# Patient Record
Sex: FEMALE | Race: WHITE | NOT HISPANIC OR LATINO | URBAN - METROPOLITAN AREA
[De-identification: names, ages, dates, MRNs, and addresses within clinical notes are randomized per-mention and may not be internally consistent; named-entity substitution may affect disease eponyms.]

---

## 2018-08-04 ENCOUNTER — INPATIENT (INPATIENT)
Facility: HOSPITAL | Age: 48
LOS: 2 days | Discharge: HOME | End: 2018-08-06
Attending: FAMILY MEDICINE | Admitting: FAMILY MEDICINE
Payer: COMMERCIAL

## 2018-08-04 VITALS
HEART RATE: 134 BPM | RESPIRATION RATE: 18 BRPM | TEMPERATURE: 98 F | SYSTOLIC BLOOD PRESSURE: 130 MMHG | DIASTOLIC BLOOD PRESSURE: 82 MMHG | OXYGEN SATURATION: 100 % | HEIGHT: 65 IN | WEIGHT: 274.92 LBS

## 2018-08-04 LAB
ALBUMIN SERPL ELPH-MCNC: 4 G/DL — SIGNIFICANT CHANGE UP (ref 3.3–5)
ALP SERPL-CCNC: 102 U/L — SIGNIFICANT CHANGE UP (ref 40–120)
ALT FLD-CCNC: 34 U/L — SIGNIFICANT CHANGE UP (ref 12–78)
ANION GAP SERPL CALC-SCNC: 14 MMOL/L — SIGNIFICANT CHANGE UP (ref 5–17)
ANION GAP SERPL CALC-SCNC: 8 MMOL/L — SIGNIFICANT CHANGE UP (ref 5–17)
APTT BLD: 32.4 SEC — SIGNIFICANT CHANGE UP (ref 27.5–37.4)
AST SERPL-CCNC: 37 U/L — SIGNIFICANT CHANGE UP (ref 15–37)
BASOPHILS # BLD AUTO: 0.03 K/UL — SIGNIFICANT CHANGE UP (ref 0–0.2)
BASOPHILS # BLD AUTO: 0.07 K/UL — SIGNIFICANT CHANGE UP (ref 0–0.2)
BASOPHILS NFR BLD AUTO: 0.2 % — SIGNIFICANT CHANGE UP (ref 0–2)
BASOPHILS NFR BLD AUTO: 0.5 % — SIGNIFICANT CHANGE UP (ref 0–2)
BILIRUB SERPL-MCNC: 0.3 MG/DL — SIGNIFICANT CHANGE UP (ref 0.2–1.2)
BUN SERPL-MCNC: 11 MG/DL — SIGNIFICANT CHANGE UP (ref 7–23)
BUN SERPL-MCNC: 14 MG/DL — SIGNIFICANT CHANGE UP (ref 7–23)
CALCIUM SERPL-MCNC: 9.3 MG/DL — SIGNIFICANT CHANGE UP (ref 8.5–10.1)
CALCIUM SERPL-MCNC: 9.5 MG/DL — SIGNIFICANT CHANGE UP (ref 8.5–10.1)
CHLORIDE SERPL-SCNC: 100 MMOL/L — SIGNIFICANT CHANGE UP (ref 96–108)
CHLORIDE SERPL-SCNC: 105 MMOL/L — SIGNIFICANT CHANGE UP (ref 96–108)
CO2 SERPL-SCNC: 23 MMOL/L — SIGNIFICANT CHANGE UP (ref 22–31)
CO2 SERPL-SCNC: 25 MMOL/L — SIGNIFICANT CHANGE UP (ref 22–31)
CREAT SERPL-MCNC: 0.78 MG/DL — SIGNIFICANT CHANGE UP (ref 0.5–1.3)
CREAT SERPL-MCNC: 1.29 MG/DL — SIGNIFICANT CHANGE UP (ref 0.5–1.3)
EOSINOPHIL # BLD AUTO: 0.04 K/UL — SIGNIFICANT CHANGE UP (ref 0–0.5)
EOSINOPHIL # BLD AUTO: 0.17 K/UL — SIGNIFICANT CHANGE UP (ref 0–0.5)
EOSINOPHIL NFR BLD AUTO: 0.3 % — SIGNIFICANT CHANGE UP (ref 0–6)
EOSINOPHIL NFR BLD AUTO: 1.2 % — SIGNIFICANT CHANGE UP (ref 0–6)
GLUCOSE SERPL-MCNC: 100 MG/DL — HIGH (ref 70–99)
GLUCOSE SERPL-MCNC: 125 MG/DL — HIGH (ref 70–99)
HCT VFR BLD CALC: 40.7 % — SIGNIFICANT CHANGE UP (ref 34.5–45)
HCT VFR BLD CALC: 42.3 % — SIGNIFICANT CHANGE UP (ref 34.5–45)
HGB BLD-MCNC: 13.2 G/DL — SIGNIFICANT CHANGE UP (ref 11.5–15.5)
HGB BLD-MCNC: 13.8 G/DL — SIGNIFICANT CHANGE UP (ref 11.5–15.5)
IMM GRANULOCYTES NFR BLD AUTO: 0.7 % — SIGNIFICANT CHANGE UP (ref 0–1.5)
IMM GRANULOCYTES NFR BLD AUTO: 0.9 % — SIGNIFICANT CHANGE UP (ref 0–1.5)
INR BLD: 1.01 RATIO — SIGNIFICANT CHANGE UP (ref 0.88–1.16)
LYMPHOCYTES # BLD AUTO: 17.4 % — SIGNIFICANT CHANGE UP (ref 13–44)
LYMPHOCYTES # BLD AUTO: 2.53 K/UL — SIGNIFICANT CHANGE UP (ref 1–3.3)
LYMPHOCYTES # BLD AUTO: 2.62 K/UL — SIGNIFICANT CHANGE UP (ref 1–3.3)
LYMPHOCYTES # BLD AUTO: 21.7 % — SIGNIFICANT CHANGE UP (ref 13–44)
MAGNESIUM SERPL-MCNC: 2.1 MG/DL — SIGNIFICANT CHANGE UP (ref 1.6–2.6)
MCHC RBC-ENTMCNC: 26.5 PG — LOW (ref 27–34)
MCHC RBC-ENTMCNC: 26.6 PG — LOW (ref 27–34)
MCHC RBC-ENTMCNC: 32.4 GM/DL — SIGNIFICANT CHANGE UP (ref 32–36)
MCHC RBC-ENTMCNC: 32.6 GM/DL — SIGNIFICANT CHANGE UP (ref 32–36)
MCV RBC AUTO: 81.6 FL — SIGNIFICANT CHANGE UP (ref 80–100)
MCV RBC AUTO: 81.7 FL — SIGNIFICANT CHANGE UP (ref 80–100)
MONOCYTES # BLD AUTO: 0.64 K/UL — SIGNIFICANT CHANGE UP (ref 0–0.9)
MONOCYTES # BLD AUTO: 0.74 K/UL — SIGNIFICANT CHANGE UP (ref 0–0.9)
MONOCYTES NFR BLD AUTO: 5.1 % — SIGNIFICANT CHANGE UP (ref 2–14)
MONOCYTES NFR BLD AUTO: 5.3 % — SIGNIFICANT CHANGE UP (ref 2–14)
NEUTROPHILS # BLD AUTO: 10.88 K/UL — HIGH (ref 1.8–7.4)
NEUTROPHILS # BLD AUTO: 8.66 K/UL — HIGH (ref 1.8–7.4)
NEUTROPHILS NFR BLD AUTO: 71.8 % — SIGNIFICANT CHANGE UP (ref 43–77)
NEUTROPHILS NFR BLD AUTO: 74.9 % — SIGNIFICANT CHANGE UP (ref 43–77)
NRBC # BLD: 0 /100 WBCS — SIGNIFICANT CHANGE UP (ref 0–0)
NRBC # BLD: 0 /100 WBCS — SIGNIFICANT CHANGE UP (ref 0–0)
NT-PROBNP SERPL-SCNC: 435 PG/ML — HIGH (ref 0–125)
PLATELET # BLD AUTO: 399 K/UL — SIGNIFICANT CHANGE UP (ref 150–400)
PLATELET # BLD AUTO: 473 K/UL — HIGH (ref 150–400)
POTASSIUM SERPL-MCNC: 3.1 MMOL/L — LOW (ref 3.5–5.3)
POTASSIUM SERPL-MCNC: 3.3 MMOL/L — LOW (ref 3.5–5.3)
POTASSIUM SERPL-MCNC: 4.2 MMOL/L — SIGNIFICANT CHANGE UP (ref 3.5–5.3)
POTASSIUM SERPL-SCNC: 3.1 MMOL/L — LOW (ref 3.5–5.3)
POTASSIUM SERPL-SCNC: 3.3 MMOL/L — LOW (ref 3.5–5.3)
POTASSIUM SERPL-SCNC: 4.2 MMOL/L — SIGNIFICANT CHANGE UP (ref 3.5–5.3)
PROT SERPL-MCNC: 8.1 GM/DL — SIGNIFICANT CHANGE UP (ref 6–8.3)
PROTHROM AB SERPL-ACNC: 10.9 SEC — SIGNIFICANT CHANGE UP (ref 9.8–12.7)
RBC # BLD: 4.99 M/UL — SIGNIFICANT CHANGE UP (ref 3.8–5.2)
RBC # BLD: 5.18 M/UL — SIGNIFICANT CHANGE UP (ref 3.8–5.2)
RBC # FLD: 15.3 % — HIGH (ref 10.3–14.5)
RBC # FLD: 15.7 % — HIGH (ref 10.3–14.5)
SODIUM SERPL-SCNC: 137 MMOL/L — SIGNIFICANT CHANGE UP (ref 135–145)
SODIUM SERPL-SCNC: 138 MMOL/L — SIGNIFICANT CHANGE UP (ref 135–145)
TROPONIN I SERPL-MCNC: 0.4 NG/ML — HIGH (ref 0.01–0.04)
TROPONIN I SERPL-MCNC: 1.26 NG/ML — HIGH (ref 0.01–0.04)
TROPONIN I SERPL-MCNC: 1.85 NG/ML — HIGH (ref 0.01–0.04)
TROPONIN I SERPL-MCNC: 1.95 NG/ML — HIGH (ref 0.01–0.04)
WBC # BLD: 12.08 K/UL — HIGH (ref 3.8–10.5)
WBC # BLD: 14.52 K/UL — HIGH (ref 3.8–10.5)
WBC # FLD AUTO: 12.08 K/UL — HIGH (ref 3.8–10.5)
WBC # FLD AUTO: 14.52 K/UL — HIGH (ref 3.8–10.5)

## 2018-08-04 PROCEDURE — 93010 ELECTROCARDIOGRAM REPORT: CPT | Mod: 76

## 2018-08-04 PROCEDURE — 71045 X-RAY EXAM CHEST 1 VIEW: CPT | Mod: 26

## 2018-08-04 PROCEDURE — 93306 TTE W/DOPPLER COMPLETE: CPT | Mod: 26

## 2018-08-04 PROCEDURE — 99285 EMERGENCY DEPT VISIT HI MDM: CPT

## 2018-08-04 PROCEDURE — 70450 CT HEAD/BRAIN W/O DYE: CPT | Mod: 26

## 2018-08-04 RX ORDER — ACETAMINOPHEN 500 MG
650 TABLET ORAL EVERY 8 HOURS
Qty: 0 | Refills: 0 | Status: DISCONTINUED | OUTPATIENT
Start: 2018-08-04 | End: 2018-08-06

## 2018-08-04 RX ORDER — POTASSIUM CHLORIDE 20 MEQ
40 PACKET (EA) ORAL ONCE
Qty: 0 | Refills: 0 | Status: COMPLETED | OUTPATIENT
Start: 2018-08-04 | End: 2018-08-04

## 2018-08-04 RX ORDER — ASPIRIN/CALCIUM CARB/MAGNESIUM 324 MG
325 TABLET ORAL ONCE
Qty: 0 | Refills: 0 | Status: COMPLETED | OUTPATIENT
Start: 2018-08-04 | End: 2018-08-04

## 2018-08-04 RX ORDER — ASPIRIN/CALCIUM CARB/MAGNESIUM 324 MG
81 TABLET ORAL DAILY
Qty: 0 | Refills: 0 | Status: DISCONTINUED | OUTPATIENT
Start: 2018-08-04 | End: 2018-08-04

## 2018-08-04 RX ORDER — METOPROLOL TARTRATE 50 MG
25 TABLET ORAL
Qty: 0 | Refills: 0 | Status: DISCONTINUED | OUTPATIENT
Start: 2018-08-04 | End: 2018-08-04

## 2018-08-04 RX ORDER — SODIUM CHLORIDE 9 MG/ML
2000 INJECTION INTRAMUSCULAR; INTRAVENOUS; SUBCUTANEOUS ONCE
Qty: 0 | Refills: 0 | Status: COMPLETED | OUTPATIENT
Start: 2018-08-04 | End: 2018-08-04

## 2018-08-04 RX ORDER — METOPROLOL TARTRATE 50 MG
50 TABLET ORAL
Qty: 0 | Refills: 0 | Status: DISCONTINUED | OUTPATIENT
Start: 2018-08-04 | End: 2018-08-05

## 2018-08-04 RX ORDER — SODIUM CHLORIDE 9 MG/ML
3 INJECTION INTRAMUSCULAR; INTRAVENOUS; SUBCUTANEOUS ONCE
Qty: 0 | Refills: 0 | Status: COMPLETED | OUTPATIENT
Start: 2018-08-04 | End: 2018-08-04

## 2018-08-04 RX ORDER — METOPROLOL TARTRATE 50 MG
25 TABLET ORAL ONCE
Qty: 0 | Refills: 0 | Status: COMPLETED | OUTPATIENT
Start: 2018-08-04 | End: 2018-08-04

## 2018-08-04 RX ORDER — HEPARIN SODIUM 5000 [USP'U]/ML
5000 INJECTION INTRAVENOUS; SUBCUTANEOUS EVERY 12 HOURS
Qty: 0 | Refills: 0 | Status: DISCONTINUED | OUTPATIENT
Start: 2018-08-04 | End: 2018-08-04

## 2018-08-04 RX ORDER — HEPARIN SODIUM 5000 [USP'U]/ML
INJECTION INTRAVENOUS; SUBCUTANEOUS
Qty: 25000 | Refills: 0 | Status: DISCONTINUED | OUTPATIENT
Start: 2018-08-04 | End: 2018-08-06

## 2018-08-04 RX ADMIN — Medication 25 MILLIGRAM(S): at 20:25

## 2018-08-04 RX ADMIN — HEPARIN SODIUM 1000 UNIT(S)/HR: 5000 INJECTION INTRAVENOUS; SUBCUTANEOUS at 20:22

## 2018-08-04 RX ADMIN — SODIUM CHLORIDE 2000 MILLILITER(S): 9 INJECTION INTRAMUSCULAR; INTRAVENOUS; SUBCUTANEOUS at 01:30

## 2018-08-04 RX ADMIN — Medication 25 MILLIGRAM(S): at 07:04

## 2018-08-04 RX ADMIN — HEPARIN SODIUM 5000 UNIT(S): 5000 INJECTION INTRAVENOUS; SUBCUTANEOUS at 17:49

## 2018-08-04 RX ADMIN — SODIUM CHLORIDE 2000 MILLILITER(S): 9 INJECTION INTRAMUSCULAR; INTRAVENOUS; SUBCUTANEOUS at 00:32

## 2018-08-04 RX ADMIN — Medication 25 MILLIGRAM(S): at 17:50

## 2018-08-04 RX ADMIN — SODIUM CHLORIDE 3 MILLILITER(S): 9 INJECTION INTRAMUSCULAR; INTRAVENOUS; SUBCUTANEOUS at 00:44

## 2018-08-04 RX ADMIN — Medication 325 MILLIGRAM(S): at 01:39

## 2018-08-04 RX ADMIN — Medication 40 MILLIEQUIVALENT(S): at 03:47

## 2018-08-04 RX ADMIN — Medication 40 MILLIEQUIVALENT(S): at 22:28

## 2018-08-04 NOTE — ED ADULT TRIAGE NOTE - CHIEF COMPLAINT QUOTE
Patient reports that she was at a bar tonight and after drinking and getting into a verbal argument began to have palpitations. With EMS went into Afib with RVR, Vtach with a pulse, and then broke back to Afib. No history of Afib. Given 20 mg IV of Cardizem. Current in AFib with no complaints.

## 2018-08-04 NOTE — CONSULT NOTE ADULT - SUBJECTIVE AND OBJECTIVE BOX
CHIEF COMPLAINT:    HPI:  Pt is a 48 year old female with PMh of anxiety/depression, lupus HTN who comes to the Ed BIBEMS s/p syncope today in the morning after feeling palpitations. Pt states that she went to a bar and then drank beer and then started to have profuse sweats and nausea and then was pale and 911 was called. EMS found pt in afib then went to [facility] and patient given cardizem. Pt then brought to the Ed. No complaints of pain just dizziness. No extremity weakness.     Pt in sinus rhythm at present, although EKG in ER did show AF. No acute changes on EKG. 3rd troponin 1.95. Her father has had about 10 stents. Chest pain and syncope.     PMHx:  HTN  Lupus    PSHx:  No recent surgeries    Family Hx:  Father: h/o CAD, HTN  Mother: h/o HTN, No CAD (04 Aug 2018 04:42)      PAST MEDICAL & SURGICAL HISTORY:  No pertinent past medical history  No significant past surgical history      Allergies    No Known Allergies    Intolerances        Occupation:  Alochol: Denied  Smoking: Denied  Drug Use: Denied  Marital Status:         FAMILY HISTORY:      REVIEW OF SYSTEMS:    CONSTITUTIONAL: No weakness, fevers or chills  EYES/ENT: No visual changes;  No vertigo or throat pain   NECK: No pain or stiffness  RESPIRATORY: No cough, wheezing, hemoptysis; No shortness of breath  CARDIOVASCULAR: No chest pain or palpitations  GASTROINTESTINAL: No abdominal or epigastric pain. No nausea, vomiting, or hematemesis; No diarrhea or constipation. No melena or hematochezia.  GENITOURINARY: No dysuria, frequency or hematuria  NEUROLOGICAL: No numbness or weakness  SKIN: No itching, burning, rashes, or lesions   All other review of systems is negative unless indicated above    Vital Signs Last 24 Hrs  T(C): 36.7 (04 Aug 2018 08:13), Max: 36.9 (04 Aug 2018 03:50)  T(F): 98.1 (04 Aug 2018 08:13), Max: 98.4 (04 Aug 2018 03:50)  HR: 88 (04 Aug 2018 08:45) (73 - 134)  BP: 144/85 (04 Aug 2018 08:45) (124/80 - 153/73)  BP(mean): 62 (04 Aug 2018 03:50) (62 - 62)  RR: 24 (04 Aug 2018 08:45) (15 - 24)  SpO2: 97% (04 Aug 2018 08:45) (97% - 100%)    I&O's Summary      PHYSICAL EXAM:    Constitutional: NAD, awake and alert, well-developed  HEENT: PERR, EOMI,  No oral cyananosis.  Neck:  supple,  No JVD  Respiratory: Breath sounds are clear bilaterally, No wheezing, rales or rhonchi  Cardiovascular: S1 and S2, regular rate and rhythm, no Murmurs, gallops or rubs  Gastrointestinal: Bowel Sounds present, soft, nontender.   Extremities: No peripheral edema. No clubbing or cyanosis.  Vascular: 2+ peripheral pulses  Neurological: A/O x 3, no focal deficits  Musculoskeletal: no calf tenderness.  Skin: No rashes.    MEDICATIONS:  MEDICATIONS  (STANDING):  metoprolol tartrate 25 milliGRAM(s) Oral two times a day      LABS: All Labs Reviewed:                        13.2   12.08 )-----------( 399      ( 04 Aug 2018 05:39 )             40.7                         13.8   14.52 )-----------( 473      ( 04 Aug 2018 00:17 )             42.3     04 Aug 2018 05:39    138    |  105    |  11     ----------------------------<  100    4.2     |  25     |  0.78   04 Aug 2018 00:17    137    |  100    |  14     ----------------------------<  125    3.1     |  23     |  1.29     Ca    9.3        04 Aug 2018 05:39  Ca    9.5        04 Aug 2018 00:17    TPro  8.1    /  Alb  4.0    /  TBili  0.3    /  DBili  x      /  AST  37     /  ALT  34     /  AlkPhos  102    04 Aug 2018 00:17    PT/INR - ( 04 Aug 2018 00:17 )   PT: 10.9 sec;   INR: 1.01 ratio         PTT - ( 04 Aug 2018 00:17 )  PTT:32.4 sec  CARDIAC MARKERS ( 04 Aug 2018 05:39 )  1.950 ng/mL / x     / x     / x     / x      CARDIAC MARKERS ( 04 Aug 2018 03:10 )  1.260 ng/mL / x     / x     / x     / x      CARDIAC MARKERS ( 04 Aug 2018 00:17 )  0.400 ng/mL / x     / x     / x     / x          Blood Culture:   08-04 @ 00:17  Pro Bnp 435        RADIOLOGY/EKG:

## 2018-08-04 NOTE — CONSULT NOTE ADULT - ASSESSMENT
1 yo female presented with Syncope and A fib.    A/P:    1.  Syncope  -will follow clinically in Monitored bed  -follow clinically  -will follow CT head   -follow echo and carotid duplex report   -echo showed normal LVEF with trace MR and TR    2.  A fib with RVR  Elevated troponin, needs cath on Monday. I spoke to pt and familly. I spoke to Dr. Weir who will do cath on Monday  HTN  -would not start IV Heparin  -NSR now  -will keep on BB for now  -trend troponin  -follow cardiology consult  -CHADVasc score: 2(HTN and female)  -will need chronic anticoagulation  -continue ASA  -follow BP and adjust meds as needed    3.  SCD for LE ppx   can use Heparin or Lovenox SQ

## 2018-08-04 NOTE — ED ADULT NURSE NOTE - OBJECTIVE STATEMENT
47yo female BIBA, as per EMS :shes in afib and went into V Tach" pt indicates she was drinking at a bar when she suddenly felt chest pain and syncope. pt denies neck/back pain,  pt denies cardiac history. EMS admin 20 MG Cardizem IV push. On arrival ot is currently rapid Afib.

## 2018-08-04 NOTE — H&P ADULT - NSHPPHYSICALEXAM_GEN_ALL_CORE
Vital Signs Last 24 Hrs  T(C): 36.9 (04 Aug 2018 03:50), Max: 36.9 (04 Aug 2018 03:50)  T(F): 98.4 (04 Aug 2018 03:50), Max: 98.4 (04 Aug 2018 03:50)  HR: 79 (04 Aug 2018 03:50) (79 - 134)  BP: 138/60 (04 Aug 2018 03:50) (124/80 - 138/60)  RR: 16 (04 Aug 2018 03:50) (16 - 18)  SpO2: 99% (04 Aug 2018 03:50) (98% - 100%)

## 2018-08-04 NOTE — H&P ADULT - NEUROLOGICAL DETAILS
deep reflexes intact/sensation intact/cranial nerves intact/alert and oriented x 3/responds to pain/responds to verbal commands/normal strength

## 2018-08-04 NOTE — CHART NOTE - NSCHARTNOTEFT_GEN_A_CORE
Patient is a 48y old  Female who presents with a chief complaint of complain of palpitation, syncope (04 Aug 2018 04:42)        HPI:  Pt is a 48 year old female with PMh of anxiety/depression, lupus HTN who comes to the Ed BIBEMS s/p syncope today in the morning after feeling palpitations. Pt states that she went to a bar and then drank beer and then started to have profuse sweats and nausea and then was pale and 911 was called. EMS found pt in afib then went to [facility] and patient given cardizem. Pt then brought to the Ed. No complaints of pain just dizziness. No extremity weakness.     PMHx:  HTN  Lupus    PSHx:  No recent surgeries    Family Hx:  Father: h/o CAD, HTN  Mother: h/o HTN, No CAD (04 Aug 2018 04:42)      SUBJECTIVE & OBJECTIVE: Pt seen and examined at bedside. She is in NSR.  States that she is feeling tired, denies shortness of breath, headache, or other symptom.     PHYSICAL EXAM:  T(C): 36.7 (08-04-18 @ 08:13), Max: 36.9 (08-04-18 @ 03:50)  HR: 88 (08-04-18 @ 08:45) (73 - 134)  BP: 144/85 (08-04-18 @ 08:45) (124/80 - 153/73)  RR: 24 (08-04-18 @ 08:45) (15 - 24)  SpO2: 97% (08-04-18 @ 08:45) (97% - 100%)  Wt(kg): -- Height (cm): 165.1 (08-04 @ 00:01)  Weight (kg): 129.9 (08-04 @ 04:42)  BMI (kg/m2): 47.7 (08-04 @ 04:42)  BSA (m2): 2.3 (08-04 @ 04:42)  I&O's Detail    GENERAL: NAD, well-groomed, well-developed, obese  HEAD:  Atraumatic, Normocephalic  EYES: EOMI, PERRLA, conjunctiva and sclera clear  ENMT: Moist mucous membranes  NECK: Supple, No JVD  NERVOUS SYSTEM:  Alert & Oriented X3, Motor Strength 5/5 B/L upper and lower extremities; DTRs 2+ intact and symmetric  CHEST/LUNG: Clear to auscultation bilaterally; No rales, rhonchi, wheezing, or rubs  HEART: Regular rate and rhythm; No murmurs, rubs, or gallops  ABDOMEN: Soft, Nontender, Nondistended; Bowel sounds present  EXTREMITIES:  2+ Peripheral Pulses, No clubbing, cyanosis, or edema    MEDICATIONS  (STANDING):  heparin  Injectable 5000 Unit(s) SubCutaneous every 12 hours  metoprolol tartrate 25 milliGRAM(s) Oral two times a day    MEDICATIONS  (PRN):  acetaminophen   Tablet 650 milliGRAM(s) Oral every 8 hours PRN For Temp greater than 38 C (100.4 F)  acetaminophen   Tablet. 650 milliGRAM(s) Oral every 8 hours PRN Mild Pain (1 - 3)      LABS:                        13.2   12.08 )-----------( 399      ( 04 Aug 2018 05:39 )             40.7     08-04    138  |  105  |  11  ----------------------------<  100<H>  4.2   |  25  |  0.78    Ca    9.3      04 Aug 2018 05:39    TPro  8.1  /  Alb  4.0  /  TBili  0.3  /  DBili  x   /  AST  37  /  ALT  34  /  AlkPhos  102  08-04  PT/INR - ( 04 Aug 2018 00:17 )   PT: 10.9 sec;   INR: 1.01 ratio    PTT - ( 04 Aug 2018 00:17 )  PTT:32.4 sec    CARDIAC MARKERS ( 04 Aug 2018 05:39 )  1.950 ng/mL / x     / x     / x     / x      CARDIAC MARKERS ( 04 Aug 2018 03:10 )  1.260 ng/mL / x     / x     / x     / x      CARDIAC MARKERS ( 04 Aug 2018 00:17 )  0.400 ng/mL / x     / x     / x     / x          DVT/GI ppx      49 y/o obese female that is admitted with PAF  1.  Syncope  -will follow CT head   -follow echo and carotid duplex report     2.  A fib with RVR  Elevated troponin  HTN  -NSR now, s/p IV Cardizem   -will keep on BB for now  -trend troponin  -Cardiology consult appreciated  -CHADVasc score: 2(HTN and female)  -will need chronic anticoagulation  -follow BP and adjust meds as needed  - for cardiac catherization on Monday     3.  VTE ppx with Heparin

## 2018-08-04 NOTE — H&P ADULT - HISTORY OF PRESENT ILLNESS
Pt is a 48 year old female with PMh of anxiety/depression, lupus HTN who comes to the Ed BIBEMS s/p syncope today in the morning after feeling palpitations. Pt states that she went to a bar and then drank beer and then started to have profuse sweats and nausea and then was pale and 911 was called. EMS found pt in afib then went to [facility] and patient given cardizem. Pt then brought to the Ed. No complaints of pain just dizziness. No extremity weakness.     PMHx:  HTN  Lupus    PSHx:  No recent surgeries    Family Hx:  Father: h/o CAD, HTN  Mother: h/o HTN, No CAD

## 2018-08-04 NOTE — H&P ADULT - ASSESSMENT
47 yo female presented with Syncope and A fib.    A/P:    1.  Syncope  -will follow clinically in Monitored bed  -follow clinically  -will follow CT head   -follow echo and carotid duplex report     2.  A fib with RVR  Elevated troponin  HTN  -NSR now  -will keep on BB for now  -trend troponin  -follow cardiology consult  -CHADVasc score: 2(HTN and female)  -will need chronic anticoagulation  -will start Lovenox after Head CT ie neg for any acute brain issue   -follow BP and adjust meds as needed    3.  SCD for LE ppx 49 yo female presented with Syncope and A fib.    A/P:    1.  Syncope  -will follow clinically in Monitored bed  -follow clinically  -will follow CT head   -follow echo and carotid duplex report     2.  A fib with RVR  Elevated troponin  HTN  -NSR now, s/p IV Cardizem   -will keep on BB for now  -trend troponin  -follow cardiology consult  -CHADVasc score: 2(HTN and female)  -will need chronic anticoagulation  -will start Lovenox after Head CT ie neg for any acute brain issue   -follow BP and adjust meds as needed    3.  SCD for LE ppx

## 2018-08-04 NOTE — ED PROVIDER NOTE - OBJECTIVE STATEMENT
Pt is a 48 year old female with PMh of anxiety/depression, lupus HTN who comes to the Ed BIBEMS s/p syncope today in the morning after feeling palpitations. Pt states that she went to a bar and then drank beer and then started to have profuse sweats and nausea and then was pale and 911 was called. EMS found pt in afib then went to [facility] and patient given cardizem. Pt then brought to the Ed. No complaints of pain just dizziness.

## 2018-08-04 NOTE — ED ADULT NURSE NOTE - NSIMPLEMENTINTERV_GEN_ALL_ED
Implemented All Universal Safety Interventions:  [place] to call system. Call bell, personal items and telephone within reach. Instruct patient to call for assistance. Room bathroom lighting operational. Non-slip footwear when patient is off stretcher. Physically safe environment: no spills, clutter or unnecessary equipment. Stretcher in lowest position, wheels locked, appropriate side rails in place.

## 2018-08-05 DIAGNOSIS — I49.9 CARDIAC ARRHYTHMIA, UNSPECIFIED: ICD-10-CM

## 2018-08-05 DIAGNOSIS — I50.21 ACUTE SYSTOLIC (CONGESTIVE) HEART FAILURE: ICD-10-CM

## 2018-08-05 DIAGNOSIS — I21.4 NON-ST ELEVATION (NSTEMI) MYOCARDIAL INFARCTION: ICD-10-CM

## 2018-08-05 LAB
ABO RH CONFIRMATION: SIGNIFICANT CHANGE UP
ANION GAP SERPL CALC-SCNC: 7 MMOL/L — SIGNIFICANT CHANGE UP (ref 5–17)
APTT BLD: 54.7 SEC — HIGH (ref 27.5–37.4)
APTT BLD: 55.9 SEC — HIGH (ref 27.5–37.4)
BLD GP AB SCN SERPL QL: SIGNIFICANT CHANGE UP
BUN SERPL-MCNC: 12 MG/DL — SIGNIFICANT CHANGE UP (ref 7–23)
CALCIUM SERPL-MCNC: 8.7 MG/DL — SIGNIFICANT CHANGE UP (ref 8.5–10.1)
CHLORIDE SERPL-SCNC: 107 MMOL/L — SIGNIFICANT CHANGE UP (ref 96–108)
CO2 SERPL-SCNC: 25 MMOL/L — SIGNIFICANT CHANGE UP (ref 22–31)
CREAT SERPL-MCNC: 0.83 MG/DL — SIGNIFICANT CHANGE UP (ref 0.5–1.3)
GLUCOSE SERPL-MCNC: 114 MG/DL — HIGH (ref 70–99)
HCT VFR BLD CALC: 38 % — SIGNIFICANT CHANGE UP (ref 34.5–45)
HGB BLD-MCNC: 12.1 G/DL — SIGNIFICANT CHANGE UP (ref 11.5–15.5)
MCHC RBC-ENTMCNC: 26.5 PG — LOW (ref 27–34)
MCHC RBC-ENTMCNC: 31.8 GM/DL — LOW (ref 32–36)
MCV RBC AUTO: 83.2 FL — SIGNIFICANT CHANGE UP (ref 80–100)
NRBC # BLD: 0 /100 WBCS — SIGNIFICANT CHANGE UP (ref 0–0)
PLATELET # BLD AUTO: 345 K/UL — SIGNIFICANT CHANGE UP (ref 150–400)
POTASSIUM SERPL-MCNC: 4.2 MMOL/L — SIGNIFICANT CHANGE UP (ref 3.5–5.3)
POTASSIUM SERPL-SCNC: 4.2 MMOL/L — SIGNIFICANT CHANGE UP (ref 3.5–5.3)
RBC # BLD: 4.57 M/UL — SIGNIFICANT CHANGE UP (ref 3.8–5.2)
RBC # FLD: 15.9 % — HIGH (ref 10.3–14.5)
SODIUM SERPL-SCNC: 139 MMOL/L — SIGNIFICANT CHANGE UP (ref 135–145)
TROPONIN I SERPL-MCNC: 0.76 NG/ML — HIGH (ref 0.01–0.04)
TYPE + AB SCN PNL BLD: SIGNIFICANT CHANGE UP
WBC # BLD: 10.72 K/UL — HIGH (ref 3.8–10.5)
WBC # FLD AUTO: 10.72 K/UL — HIGH (ref 3.8–10.5)

## 2018-08-05 PROCEDURE — 93010 ELECTROCARDIOGRAM REPORT: CPT

## 2018-08-05 RX ORDER — LISINOPRIL 2.5 MG/1
5 TABLET ORAL DAILY
Qty: 0 | Refills: 0 | Status: DISCONTINUED | OUTPATIENT
Start: 2018-08-05 | End: 2018-08-06

## 2018-08-05 RX ORDER — ISOSORBIDE MONONITRATE 60 MG/1
30 TABLET, EXTENDED RELEASE ORAL DAILY
Qty: 0 | Refills: 0 | Status: DISCONTINUED | OUTPATIENT
Start: 2018-08-05 | End: 2018-08-06

## 2018-08-05 RX ORDER — METOPROLOL TARTRATE 50 MG
25 TABLET ORAL
Qty: 0 | Refills: 0 | Status: DISCONTINUED | OUTPATIENT
Start: 2018-08-05 | End: 2018-08-06

## 2018-08-05 RX ADMIN — HEPARIN SODIUM 1000 UNIT(S)/HR: 5000 INJECTION INTRAVENOUS; SUBCUTANEOUS at 09:57

## 2018-08-05 RX ADMIN — ISOSORBIDE MONONITRATE 30 MILLIGRAM(S): 60 TABLET, EXTENDED RELEASE ORAL at 18:04

## 2018-08-05 RX ADMIN — Medication 50 MILLIGRAM(S): at 08:31

## 2018-08-05 RX ADMIN — Medication 25 MILLIGRAM(S): at 21:08

## 2018-08-05 RX ADMIN — LISINOPRIL 5 MILLIGRAM(S): 2.5 TABLET ORAL at 18:04

## 2018-08-05 RX ADMIN — HEPARIN SODIUM 1000 UNIT(S)/HR: 5000 INJECTION INTRAVENOUS; SUBCUTANEOUS at 03:11

## 2018-08-05 NOTE — PROGRESS NOTE ADULT - PROBLEM SELECTOR PLAN 1
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

## 2018-08-05 NOTE — PROGRESS NOTE ADULT - SUBJECTIVE AND OBJECTIVE BOX
Patient is a 48y old  Female who presents with a chief complaint of complain of palpitation, syncope (04 Aug 2018 04:42)        HPI:  Pt is a 48 year old female with PMh of anxiety/depression, lupus HTN who comes to the Ed BIBEMS s/p syncope today in the morning after feeling palpitations. Pt states that she went to a bar and then drank beer and then started to have profuse sweats and nausea and then was pale and 911 was called. EMS found pt in afib then went to [facility] and patient given cardizem. Pt then brought to the Ed. No complaints of pain just dizziness. No extremity weakness.     PMHx:  HTN  Lupus    PSHx:  No recent surgeries    Family Hx:  Father: h/o CAD, HTN  Mother: h/o HTN, No CAD (04 Aug 2018 04:42)      SUBJECTIVE & OBJECTIVE: Pt seen and examined at bedside. Patient had an episode of Vtach overnight and elevated troponins which prompted urgentg Cardiac catherization.  She is doing well post procedure.    PHYSICAL EXAM:  T(C): 36.4 (08-05-18 @ 15:50), Max: 37.1 (08-05-18 @ 06:11)  HR: 50 (08-05-18 @ 16:00) (48 - 85)  BP: 137/74 (08-05-18 @ 16:00) (116/66 - 152/81)  RR: 19 (08-05-18 @ 16:00) (12 - 22)  SpO2: 99% (08-05-18 @ 16:00) (90% - 100%)  Wt(kg): -- Height (cm): 162.56 (08-05 @ 10:27)  Weight (kg): 129.3 (08-05 @ 10:27)  BMI (kg/m2): 48.9 (08-05 @ 10:27)  BSA (m2): 2.27 (08-05 @ 10:27)  I&O's Detail    04 Aug 2018 07:01  -  05 Aug 2018 07:00  --------------------------------------------------------  IN:    heparin  Infusion.: 110 mL  Total IN: 110 mL    OUT:  Total OUT: 0 mL    Total NET: 110 mL        GENERAL: NAD, well-groomed, well-developed  HEAD:  Atraumatic, Normocephalic  EYES: EOMI, PERRLA, conjunctiva and sclera clear  ENMT: Moist mucous membranes  NECK: Supple, No JVD  NERVOUS SYSTEM:  Alert & Oriented X3, Motor Strength 5/5 B/L upper and lower extremities; DTRs 2+ intact and symmetric  CHEST/LUNG: Clear to auscultation bilaterally; No rales, rhonchi, wheezing, or rubs  HEART: Regular rate and rhythm; No murmurs, rubs, or gallops  ABDOMEN: Soft, Nontender, Nondistended; Bowel sounds present  EXTREMITIES:  2+ Peripheral Pulses, No clubbing, cyanosis, or edema    MEDICATIONS  (STANDING):  heparin  Infusion.  Unit(s)/Hr (10 mL/Hr) IV Continuous <Continuous>  isosorbide   mononitrate ER Tablet (IMDUR) 30 milliGRAM(s) Oral daily  lisinopril 5 milliGRAM(s) Oral daily  metoprolol tartrate 25 milliGRAM(s) Oral two times a day    MEDICATIONS  (PRN):  acetaminophen   Tablet 650 milliGRAM(s) Oral every 8 hours PRN For Temp greater than 38 C (100.4 F)  acetaminophen   Tablet. 650 milliGRAM(s) Oral every 8 hours PRN Mild Pain (1 - 3)      LABS:                        12.1   10.72 )-----------( 345      ( 05 Aug 2018 02:12 )             38.0     08-05    139  |  107  |  12  ----------------------------<  114<H>  4.2   |  25  |  0.83    Ca    8.7      05 Aug 2018 09:14  Mg     2.1     08-04    TPro  8.1  /  Alb  4.0  /  TBili  0.3  /  DBili  x   /  AST  37  /  ALT  34  /  AlkPhos  102  08-04    PT/INR - ( 04 Aug 2018 00:17 )   PT: 10.9 sec;   INR: 1.01 ratio         PTT - ( 05 Aug 2018 09:14 )  PTT:55.9 sec    Magnesium, Serum: 2.1 mg/dL (08-04 @ 19:53)    CAPILLARY BLOOD GLUCOSE          CARDIAC MARKERS ( 05 Aug 2018 11:40 )  0.757 ng/mL / x     / x     / x     / x      CARDIAC MARKERS ( 04 Aug 2018 19:53 )  1.850 ng/mL / x     / x     / x     / x      CARDIAC MARKERS ( 04 Aug 2018 05:39 )  1.950 ng/mL / x     / x     / x     / x      CARDIAC MARKERS ( 04 Aug 2018 03:10 )  1.260 ng/mL / x     / x     / x     / x      CARDIAC MARKERS ( 04 Aug 2018 00:17 )  0.400 ng/mL / x     / x     / x     / x            RECENT CULTURES:      RADIOLOGY & ADDITIONAL TESTS:      DVT/GI ppx  Discussed with pt @ bedside

## 2018-08-05 NOTE — PROGRESS NOTE ADULT - SUBJECTIVE AND OBJECTIVE BOX
CHIEF COMPLAINT:    HPI:  Pt is a 48 year old female with PMh of anxiety/depression, lupus HTN who comes to the Ed BIBEMS s/p syncope today in the morning after feeling palpitations. Pt states that she went to a bar and then drank beer and then started to have profuse sweats and nausea and then was pale and 911 was called. EMS found pt in afib then went to [facility] and patient given cardizem. Pt then brought to the Ed. No complaints of pain just dizziness. No extremity weakness.     Pt in sinus rhythm at present, although EKG in ER did show AF. No acute changes on EKG. 3rd troponin 1.95. Her father has had about 10 stents. Chest pain and syncope.     8/5: 47 beats of VT yesterday. K 3.3, and given KCL. IV Heparin started. Lopressor increased to 50 mg q12h but wanda this am and dose decreased. 20 minute episode of chest pain this am. EKG now unchanged. I spoke to Dr. Weir about doing cardiac cath today.    PMHx:  HTN  Lupus    PSHx:  No recent surgeries    Family Hx:  Father: h/o CAD, HTN  Mother: h/o HTN, No CAD (04 Aug 2018 04:42)      PAST MEDICAL & SURGICAL HISTORY:  No pertinent past medical history  No significant past surgical history      Allergies    No Known Allergies    Intolerances        Occupation:  Alochol: Denied  Smoking: Denied  Drug Use: Denied  Marital Status:         FAMILY HISTORY:      REVIEW OF SYSTEMS:    CONSTITUTIONAL: No weakness, fevers or chills  EYES/ENT: No visual changes;  No vertigo or throat pain   NECK: No pain or stiffness  RESPIRATORY: No cough, wheezing, hemoptysis; No shortness of breath  CARDIOVASCULAR: No chest pain or palpitations  GASTROINTESTINAL: No abdominal or epigastric pain. No nausea, vomiting, or hematemesis; No diarrhea or constipation. No melena or hematochezia.  GENITOURINARY: No dysuria, frequency or hematuria  NEUROLOGICAL: No numbness or weakness  SKIN: No itching, burning, rashes, or lesions   All other review of systems is negative unless indicated above    Vital Signs Last 24 Hrs  T(C): 36.7 (04 Aug 2018 08:13), Max: 36.9 (04 Aug 2018 03:50)  T(F): 98.1 (04 Aug 2018 08:13), Max: 98.4 (04 Aug 2018 03:50)  HR: 88 (04 Aug 2018 08:45) (73 - 134)  BP: 144/85 (04 Aug 2018 08:45) (124/80 - 153/73)  BP(mean): 62 (04 Aug 2018 03:50) (62 - 62)  RR: 24 (04 Aug 2018 08:45) (15 - 24)  SpO2: 97% (04 Aug 2018 08:45) (97% - 100%)    I&O's Summary      PHYSICAL EXAM:    Constitutional: NAD, awake and alert, well-developed  HEENT: PERR, EOMI,  No oral cyananosis.  Neck:  supple,  No JVD  Respiratory: Breath sounds are clear bilaterally, No wheezing, rales or rhonchi  Cardiovascular: S1 and S2, regular rate and rhythm, no Murmurs, gallops or rubs  Gastrointestinal: Bowel Sounds present, soft, nontender.   Extremities: No peripheral edema. No clubbing or cyanosis.  Vascular: 2+ peripheral pulses  Neurological: A/O x 3, no focal deficits  Musculoskeletal: no calf tenderness.  Skin: No rashes.    MEDICATIONS:  MEDICATIONS  (STANDING):  metoprolol tartrate 25 milliGRAM(s) Oral two times a day      LABS: All Labs Reviewed:                        13.2   12.08 )-----------( 399      ( 04 Aug 2018 05:39 )             40.7                         13.8   14.52 )-----------( 473      ( 04 Aug 2018 00:17 )             42.3     04 Aug 2018 05:39    138    |  105    |  11     ----------------------------<  100    4.2     |  25     |  0.78   04 Aug 2018 00:17    137    |  100    |  14     ----------------------------<  125    3.1     |  23     |  1.29     Ca    9.3        04 Aug 2018 05:39  Ca    9.5        04 Aug 2018 00:17    TPro  8.1    /  Alb  4.0    /  TBili  0.3    /  DBili  x      /  AST  37     /  ALT  34     /  AlkPhos  102    04 Aug 2018 00:17    PT/INR - ( 04 Aug 2018 00:17 )   PT: 10.9 sec;   INR: 1.01 ratio         PTT - ( 04 Aug 2018 00:17 )  PTT:32.4 sec  CARDIAC MARKERS ( 04 Aug 2018 05:39 )  1.950 ng/mL / x     / x     / x     / x      CARDIAC MARKERS ( 04 Aug 2018 03:10 )  1.260 ng/mL / x     / x     / x     / x      CARDIAC MARKERS ( 04 Aug 2018 00:17 )  0.400 ng/mL / x     / x     / x     / x          Blood Culture:   08-04 @ 00:17  Pro Bnp 435        RADIOLOGY/EKG:

## 2018-08-06 VITALS
SYSTOLIC BLOOD PRESSURE: 112 MMHG | RESPIRATION RATE: 16 BRPM | DIASTOLIC BLOOD PRESSURE: 62 MMHG | TEMPERATURE: 98 F | OXYGEN SATURATION: 98 % | HEART RATE: 52 BPM

## 2018-08-06 LAB
APTT BLD: 31.8 SEC — SIGNIFICANT CHANGE UP (ref 27.5–37.4)
CHOLEST SERPL-MCNC: 159 MG/DL — SIGNIFICANT CHANGE UP (ref 10–199)
HCT VFR BLD CALC: 36.8 % — SIGNIFICANT CHANGE UP (ref 34.5–45)
HDLC SERPL-MCNC: 63 MG/DL — SIGNIFICANT CHANGE UP (ref 40–125)
HGB BLD-MCNC: 11.6 G/DL — SIGNIFICANT CHANGE UP (ref 11.5–15.5)
LIPID PNL WITH DIRECT LDL SERPL: 78 MG/DL — SIGNIFICANT CHANGE UP
MCHC RBC-ENTMCNC: 26.6 PG — LOW (ref 27–34)
MCHC RBC-ENTMCNC: 31.5 GM/DL — LOW (ref 32–36)
MCV RBC AUTO: 84.4 FL — SIGNIFICANT CHANGE UP (ref 80–100)
NRBC # BLD: 0 /100 WBCS — SIGNIFICANT CHANGE UP (ref 0–0)
PLATELET # BLD AUTO: 319 K/UL — SIGNIFICANT CHANGE UP (ref 150–400)
RBC # BLD: 4.36 M/UL — SIGNIFICANT CHANGE UP (ref 3.8–5.2)
RBC # FLD: 15.9 % — HIGH (ref 10.3–14.5)
TOTAL CHOLESTEROL/HDL RATIO MEASUREMENT: 2.5 RATIO — LOW (ref 3.3–7.1)
TRIGL SERPL-MCNC: 91 MG/DL — SIGNIFICANT CHANGE UP (ref 10–149)
WBC # BLD: 11.14 K/UL — HIGH (ref 3.8–10.5)
WBC # FLD AUTO: 11.14 K/UL — HIGH (ref 3.8–10.5)

## 2018-08-06 PROCEDURE — 93880 EXTRACRANIAL BILAT STUDY: CPT | Mod: 26

## 2018-08-06 RX ORDER — LOSARTAN/HYDROCHLOROTHIAZIDE 100MG-25MG
1 TABLET ORAL
Qty: 0 | Refills: 0 | COMMUNITY

## 2018-08-06 RX ORDER — METOPROLOL TARTRATE 50 MG
1 TABLET ORAL
Qty: 60 | Refills: 0 | OUTPATIENT
Start: 2018-08-06 | End: 2018-09-04

## 2018-08-06 RX ORDER — ASPIRIN/CALCIUM CARB/MAGNESIUM 324 MG
325 TABLET ORAL DAILY
Qty: 0 | Refills: 0 | Status: DISCONTINUED | OUTPATIENT
Start: 2018-08-06 | End: 2018-08-06

## 2018-08-06 RX ORDER — APIXABAN 2.5 MG/1
1 TABLET, FILM COATED ORAL
Qty: 60 | Refills: 0 | OUTPATIENT
Start: 2018-08-06 | End: 2018-09-04

## 2018-08-06 RX ORDER — APIXABAN 2.5 MG/1
5 TABLET, FILM COATED ORAL EVERY 12 HOURS
Qty: 0 | Refills: 0 | Status: DISCONTINUED | OUTPATIENT
Start: 2018-08-06 | End: 2018-08-06

## 2018-08-06 RX ORDER — NIFEDIPINE 30 MG
1 TABLET, EXTENDED RELEASE 24 HR ORAL
Qty: 0 | Refills: 0 | COMMUNITY

## 2018-08-06 RX ORDER — ASPIRIN/CALCIUM CARB/MAGNESIUM 324 MG
1 TABLET ORAL
Qty: 30 | Refills: 0 | OUTPATIENT
Start: 2018-08-06 | End: 2018-09-04

## 2018-08-06 RX ORDER — ISOSORBIDE MONONITRATE 60 MG/1
1 TABLET, EXTENDED RELEASE ORAL
Qty: 30 | Refills: 0 | OUTPATIENT
Start: 2018-08-06 | End: 2018-09-04

## 2018-08-06 RX ORDER — LISINOPRIL 2.5 MG/1
1 TABLET ORAL
Qty: 30 | Refills: 0 | OUTPATIENT
Start: 2018-08-06 | End: 2018-09-04

## 2018-08-06 RX ADMIN — Medication 325 MILLIGRAM(S): at 12:25

## 2018-08-06 RX ADMIN — ISOSORBIDE MONONITRATE 30 MILLIGRAM(S): 60 TABLET, EXTENDED RELEASE ORAL at 12:03

## 2018-08-06 RX ADMIN — LISINOPRIL 5 MILLIGRAM(S): 2.5 TABLET ORAL at 10:16

## 2018-08-06 NOTE — DISCHARGE NOTE ADULT - PLAN OF CARE
improvement of cardiac function 1.  Follow up for repeat TTE in 3-6 months  2.  Take all medications (Lopressor, Lisinopril) as directed  3.  Follow up with Cardiology as outpatient resolved, likely secondary to coronary vasospasm 1.  Take all medications as prescribed  2.  Avoid caffeinated products, avoid strenuous activity  3.  Follow up with cardiology as outpatinet  4.  CHADsVasc - 2 (HTN + Female) - oral AC with Eliquis as patient has previous history of APS with past pregnancy. resolved, likely secodary to coronary Vasospasm 1.  Take all medications as prescribed (IMDUR)  2.  Follow up with Cardiology as outpatient this week 1.  Take all medications as prescribed  2.  Avoid caffeinated products, avoid strenuous activity  3.  Follow up with cardiology as outpatinet  4.  CHADsVasc - 2 (HTN + Female) - oral AC deferred per Cardiology.  Patient will be d/c home on full dose aspirin.

## 2018-08-06 NOTE — DISCHARGE NOTE ADULT - MEDICATION SUMMARY - MEDICATIONS TO TAKE
I will START or STAY ON the medications listed below when I get home from the hospital:    lisinopril 5 mg oral tablet  -- 1 tab(s) by mouth once a day  -- Indication: For Acute systolic heart failure    isosorbide mononitrate 30 mg oral tablet, extended release  -- 1 tab(s) by mouth once a day  -- Indication: For Ventricular arrhythmia    apixaban 5 mg oral tablet  -- 1 tab(s) by mouth every 12 hours  -- Indication: For Transient Atrial Fibrillation.    metoprolol tartrate 25 mg oral tablet  -- 1 tab(s) by mouth 2 times a day  -- Indication: For Acute systolic heart failure I will START or STAY ON the medications listed below when I get home from the hospital:    aspirin 325 mg oral delayed release tablet  -- 1 tab(s) by mouth once a day  -- Indication: For Transient Afib, Anti-phospholipid Syndrome    lisinopril 5 mg oral tablet  -- 1 tab(s) by mouth once a day  -- Indication: For Acute systolic heart failure    isosorbide mononitrate 30 mg oral tablet, extended release  -- 1 tab(s) by mouth once a day  -- Indication: For Ventricular arrhythmia    metoprolol tartrate 25 mg oral tablet  -- 1 tab(s) by mouth 2 times a day  -- Indication: For Acute systolic heart failure

## 2018-08-06 NOTE — DISCHARGE NOTE ADULT - HOSPITAL COURSE
Patient is a 48y old  Female who presents with a chief complaint of complain of palpitation, syncope (04 Aug 2018 04:42)  HPI:Pt is a 48 year old female with PMh of anxiety/depression, lupus HTN who comes to the Ed BIBEMS s/p syncope today in the morning after feeling palpitations. Pt states that she went to a bar and then drank beer and then started to have profuse sweats and nausea and then was pale and 911 was called. EMS found pt in afib then went to [facility] and patient given cardizem. Pt then brought to the Ed. No complaints of pain just dizziness. No extremity weakness.   HOSPITAL COURSE:  Patient was admitted to CCU and placed on cardiac monitor which demonstrated Atrial Fibrillation.  During day 1 of her hospital stay, she experienced an episode of chest pain, dizziness, diaphoresis, and shortness of breath.  This corresponded to a period of 45 minutes of ventricular tachycardia.  Troponins at this time were elevated  from 0.4 -> 1.98. Patient was started on Heparin. Patient had emergent cardiac catherization on 8/5/18 with findings significant for severe coronary vasospasm of the left circumflex.  This was resolved with administration of NTG.  Patient did well post-catherization and had no further episodes of arrythmia.  Patient did have several episodes of bradycardia to (50's)  Her dose of Lopressor was decreased to 25 mg BID.  Patient will also be discharged on Elliquis for AC as her CHADSVASc2 = 2 (Female + HTN.)  She was seen by Cardiology and cleared for d/c home.  Her PCP's office (Dr. Chang) has been contacted.  I spoke with TRISTIN Mccormick and provided an update of the patient's hospital course and current changes to her medications.  Patient is informed that she should follow up with Cardiology as an outpatient.    PHYSICAL EXAM:  T(C): 36.7 (08-06-18 @ 09:08), Max: 37.1 (08-06-18 @ 06:52)  HR: 67 (08-05-18 @ 21:00) (49 - 84)  BP: 115/70 (08-05-18 @ 21:00) (115/70 - 147/74)  RR: 20 (08-05-18 @ 21:00) (12 - 20)  SpO2: 97% (08-05-18 @ 21:00) (90% - 100%)  Wt(kg): -- Height (cm): 162.56 (08-05 @ 10:27)  Weight (kg): 129.3 (08-05 @ 10:27)  BMI (kg/m2): 48.9 (08-05 @ 10:27)  BSA (m2): 2.27 (08-05 @ 10:27)  GENERAL: NAD, well-groomed, well-developed  HEAD:  Atraumatic, Normocephalic  EYES: EOMI, PERRLA, conjunctiva and sclera clear  ENMT: Moist mucous membranes  NECK: Supple, No JVD  NERVOUS SYSTEM:  Alert & Oriented X3, Motor Strength 5/5 B/L upper and lower extremities; DTRs 2+ intact and symmetric  CHEST/LUNG: Clear to auscultation bilaterally; No rales, rhonchi, wheezing, or rubs  HEART: Regular rate and rhythm; No murmurs, rubs, or gallops  ABDOMEN: Soft, Nontender, Nondistended; Bowel sounds present  EXTREMITIES:  2+ Peripheral Pulses, No clubbing, cyanosis, or edema  MEDICATIONS  (STANDING):  heparin  Infusion.  Unit(s)/Hr (10 mL/Hr) IV Continuous <Continuous>  isosorbide   mononitrate ER Tablet (IMDUR) 30 milliGRAM(s) Oral daily  lisinopril 5 milliGRAM(s) Oral daily  metoprolol tartrate 25 milliGRAM(s) Oral two times a day  LABS:                        11.6   11.14 )-----------( 319      ( 06 Aug 2018 04:45 )             36.8     08-05    139  |  107  |  12  ----------------------------<  114<H>  4.2   |  25  |  0.83  Ca    8.7      05 Aug 2018 09:14Mg     2.1     08-04  PTT - ( 06 Aug 2018 04:45 )  PTT:31.8 sec  CARDIAC MARKERS ( 05 Aug 2018 11:40 )0.757 ng/mL CARDIAC MARKERS ( 04 Aug 2018 19:53 )1.850 ng/mL    RADIOLOGY & ADDITIONAL TESTS:  < from: Cardiac Cath Lab - Adult (08.05.18 @ 11:30) >   Normal coronary arteries. Spasm noted in the distal LCx. Mildly reduced left ventricular systolic function with segmental wall  motion abnormalities. Estimated LV ejection fraction is 45 %. No aortic valve stenosis. Manage with medical therapy.  Add nitrates. ACE inhibitors Patient is a 48y old  Female who presents with a chief complaint of complain of palpitation, syncope (04 Aug 2018 04:42)  HPI:Pt is a 48 year old female with PMh of anxiety/depression, lupus HTN who comes to the Ed BIBEMS s/p syncope today in the morning after feeling palpitations. Pt states that she went to a bar and then drank beer and then started to have profuse sweats and nausea and then was pale and 911 was called. EMS found pt in afib then went to [facility] and patient given cardizem. Pt then brought to the Ed. No complaints of pain just dizziness. No extremity weakness.   HOSPITAL COURSE:  Patient was admitted to CCU and placed on cardiac monitor which demonstrated Atrial Fibrillation.  During day 1 of her hospital stay, she experienced an episode of chest pain, dizziness, diaphoresis, and shortness of breath.  This corresponded to a period of 45 minutes of ventricular tachycardia.  Troponins at this time were elevated  from 0.4 -> 1.98. Patient was started on Heparin. Patient had emergent cardiac catherization on 8/5/18 with findings significant for severe coronary vasospasm of the left circumflex.  This was resolved with administration of NTG.  Patient did well post-catherization and had no further episodes of arrythmia.  Patient did have several episodes of bradycardia to (50's)  Her dose of Lopressor was decreased to 25 mg BID.  Am dose of Lopressor held today.  Oral AC deferred by Cardiology at this time CHADSVASc2 = 2 (Female + HTN.)  Patient will be d/c on Aspirin. She was seen by Cardiology and cleared for d/c home.  Her PCP's office (Dr. Chang) has been contacted.  I spoke with TRISTIN Mccormick and provided an update of the patient's hospital course and current changes to her medications.  Patient is informed that she should follow up with Cardiology as an outpatient.    PHYSICAL EXAM:  T(C): 36.7 (08-06-18 @ 09:08), Max: 37.1 (08-06-18 @ 06:52)  HR: 67 (08-05-18 @ 21:00) (49 - 84)  BP: 115/70 (08-05-18 @ 21:00) (115/70 - 147/74)  RR: 20 (08-05-18 @ 21:00) (12 - 20)  SpO2: 97% (08-05-18 @ 21:00) (90% - 100%)  Wt(kg): -- Height (cm): 162.56 (08-05 @ 10:27)  Weight (kg): 129.3 (08-05 @ 10:27)  BMI (kg/m2): 48.9 (08-05 @ 10:27)  BSA (m2): 2.27 (08-05 @ 10:27)  GENERAL: NAD, well-groomed, well-developed  HEAD:  Atraumatic, Normocephalic  EYES: EOMI, PERRLA, conjunctiva and sclera clear  ENMT: Moist mucous membranes  NECK: Supple, No JVD  NERVOUS SYSTEM:  Alert & Oriented X3, Motor Strength 5/5 B/L upper and lower extremities; DTRs 2+ intact and symmetric  CHEST/LUNG: Clear to auscultation bilaterally; No rales, rhonchi, wheezing, or rubs  HEART: Regular rate and rhythm; No murmurs, rubs, or gallops  ABDOMEN: Soft, Nontender, Nondistended; Bowel sounds present  EXTREMITIES:  2+ Peripheral Pulses, No clubbing, cyanosis, or edema  MEDICATIONS  (STANDING):  heparin  Infusion.  Unit(s)/Hr (10 mL/Hr) IV Continuous <Continuous>  isosorbide   mononitrate ER Tablet (IMDUR) 30 milliGRAM(s) Oral daily  lisinopril 5 milliGRAM(s) Oral daily  metoprolol tartrate 25 milliGRAM(s) Oral two times a day  LABS:                        11.6   11.14 )-----------( 319      ( 06 Aug 2018 04:45 )             36.8     08-05    139  |  107  |  12  ----------------------------<  114<H>  4.2   |  25  |  0.83  Ca    8.7      05 Aug 2018 09:14Mg     2.1     08-04  PTT - ( 06 Aug 2018 04:45 )  PTT:31.8 sec  CARDIAC MARKERS ( 05 Aug 2018 11:40 )0.757 ng/mL CARDIAC MARKERS ( 04 Aug 2018 19:53 )1.850 ng/mL    RADIOLOGY & ADDITIONAL TESTS:  < from: Cardiac Cath Lab - Adult (08.05.18 @ 11:30) >   Normal coronary arteries. Spasm noted in the distal LCx. Mildly reduced left ventricular systolic function with segmental wall  motion abnormalities. Estimated LV ejection fraction is 45 %. No aortic valve stenosis. Manage with medical therapy.  Add nitrates. ACE inhibitors

## 2018-08-06 NOTE — DISCHARGE NOTE ADULT - PATIENT PORTAL LINK FT
You can access the [facility] Patient Portal, offered by [facility], by registering with the following website: [email or web address]

## 2018-08-06 NOTE — DISCHARGE NOTE ADULT - CARE PLAN
Principal Discharge DX:	Atrial fibrillation, transient  Goal:	resolved, likely secondary to coronary vasospasm  Assessment and plan of treatment:	1.  Take all medications as prescribed  2.  Avoid caffeinated products, avoid strenuous activity  3.  Follow up with cardiology as outpatinet  4.  CHADsVasc - 2 (HTN + Female) - oral AC with Eliquis as patient has previous history of APS with past pregnancy.  Secondary Diagnosis:	Ventricular arrhythmia  Goal:	resolved, likely secodary to coronary Vasospasm  Assessment and plan of treatment:	1.  Take all medications as prescribed (IMDUR)  2.  Follow up with Cardiology as outpatient this week  Secondary Diagnosis:	Acute systolic heart failure  Goal:	improvement of cardiac function  Assessment and plan of treatment:	1.  Follow up for repeat TTE in 3-6 months  2.  Take all medications (Lopressor, Lisinopril) as directed  3.  Follow up with Cardiology as outpatient Principal Discharge DX:	Atrial fibrillation, transient  Goal:	resolved, likely secondary to coronary vasospasm  Assessment and plan of treatment:	1.  Take all medications as prescribed  2.  Avoid caffeinated products, avoid strenuous activity  3.  Follow up with cardiology as outpatinet  4.  CHADsVasc - 2 (HTN + Female) - oral AC deferred per Cardiology.  Patient will be d/c home on full dose aspirin.  Secondary Diagnosis:	Ventricular arrhythmia  Goal:	resolved, likely secodary to coronary Vasospasm  Assessment and plan of treatment:	1.  Take all medications as prescribed (IMDUR)  2.  Follow up with Cardiology as outpatient this week  Secondary Diagnosis:	Acute systolic heart failure  Goal:	improvement of cardiac function  Assessment and plan of treatment:	1.  Follow up for repeat TTE in 3-6 months  2.  Take all medications (Lopressor, Lisinopril) as directed  3.  Follow up with Cardiology as outpatient

## 2018-08-06 NOTE — PROGRESS NOTE ADULT - SUBJECTIVE AND OBJECTIVE BOX
CHIEF COMPLAINT:    HPI:  Pt is a 48 year old female with PMh of anxiety/depression, lupus HTN who comes to the Ed BIBEMS s/p syncope today in the morning after feeling palpitations. Pt states that she went to a bar and then drank beer and then started to have profuse sweats and nausea and then was pale and 911 was called. EMS found pt in afib then went to [facility] and patient given cardizem. Pt then brought to the Ed. No complaints of pain just dizziness. No extremity weakness.     Pt in sinus rhythm at present, although EKG in ER did show AF. No acute changes on EKG. 3rd troponin 1.95. Her father has had about 10 stents. Chest pain and syncope.     8/5: 47 beats of VT yesterday. K 3.3, and given KCL. IV Heparin started. Lopressor increased to 50 mg q12h but wanda this am and dose decreased. 20 minute episode of chest pain this am. EKG now unchanged. I spoke to Dr. Weir about doing cardiac cath today.    8/6: cath showed normal coronaries with severe LCX spasm which resolved with IC NTG. Pt started on Imdur. Continue ACE, BB, and ASA. No further AF or VT. Stable for D/C and I will see her in office within 1 week. I looked at echo and EF was 50%.    PMHx:  HTN  Lupus    PSHx:  No recent surgeries    Family Hx:  Father: h/o CAD, HTN  Mother: h/o HTN, No CAD (04 Aug 2018 04:42)      PAST MEDICAL & SURGICAL HISTORY:  No pertinent past medical history  No significant past surgical history      Allergies    No Known Allergies    Intolerances        Occupation:  [employer]: Denied  Smoking: Denied  Drug Use: Denied  Marital Status:         FAMILY HISTORY:      REVIEW OF SYSTEMS:    CONSTITUTIONAL: No weakness, fevers or chills  EYES/ENT: No visual changes;  No vertigo or throat pain   NECK: No pain or stiffness  RESPIRATORY: No cough, wheezing, hemoptysis; No shortness of breath  CARDIOVASCULAR: No chest pain or palpitations  GASTROINTESTINAL: No abdominal or epigastric pain. No nausea, vomiting, or hematemesis; No diarrhea or constipation. No melena or hematochezia.  GENITOURINARY: No dysuria, frequency or hematuria  NEUROLOGICAL: No numbness or weakness  SKIN: No itching, burning, rashes, or lesions   All other review of systems is negative unless indicated above    Vital Signs Last 24 Hrs  T(C): 36.7 (04 Aug 2018 08:13), Max: 36.9 (04 Aug 2018 03:50)  T(F): 98.1 (04 Aug 2018 08:13), Max: 98.4 (04 Aug 2018 03:50)  HR: 88 (04 Aug 2018 08:45) (73 - 134)  BP: 144/85 (04 Aug 2018 08:45) (124/80 - 153/73)  BP(mean): 62 (04 Aug 2018 03:50) (62 - 62)  RR: 24 (04 Aug 2018 08:45) (15 - 24)  SpO2: 97% (04 Aug 2018 08:45) (97% - 100%)    I&O's Summary      PHYSICAL EXAM:    Constitutional: NAD, awake and alert, well-developed  HEENT: PERR, EOMI,  No oral cyananosis.  Neck:  supple,  No JVD  Respiratory: Breath sounds are clear bilaterally, No wheezing, rales or rhonchi  Cardiovascular: S1 and S2, regular rate and rhythm, no Murmurs, gallops or rubs  Gastrointestinal: Bowel Sounds present, soft, nontender.   Extremities: No peripheral edema. No clubbing or cyanosis.  Vascular: 2+ peripheral pulses  Neurological: A/O x 3, no focal deficits  Musculoskeletal: no calf tenderness.  Skin: No rashes.    MEDICATIONS:  MEDICATIONS  (STANDING):  metoprolol tartrate 25 milliGRAM(s) Oral two times a day      LABS: All Labs Reviewed:                        13.2   12.08 )-----------( 399      ( 04 Aug 2018 05:39 )             40.7                         13.8   14.52 )-----------( 473      ( 04 Aug 2018 00:17 )             42.3     04 Aug 2018 05:39    138    |  105    |  11     ----------------------------<  100    4.2     |  25     |  0.78   04 Aug 2018 00:17    137    |  100    |  14     ----------------------------<  125    3.1     |  23     |  1.29     Ca    9.3        04 Aug 2018 05:39  Ca    9.5        04 Aug 2018 00:17    TPro  8.1    /  Alb  4.0    /  TBili  0.3    /  DBili  x      /  AST  37     /  ALT  34     /  AlkPhos  102    04 Aug 2018 00:17    PT/INR - ( 04 Aug 2018 00:17 )   PT: 10.9 sec;   INR: 1.01 ratio         PTT - ( 04 Aug 2018 00:17 )  PTT:32.4 sec  CARDIAC MARKERS ( 04 Aug 2018 05:39 )  1.950 ng/mL / x     / x     / x     / x      CARDIAC MARKERS ( 04 Aug 2018 03:10 )  1.260 ng/mL / x     / x     / x     / x      CARDIAC MARKERS ( 04 Aug 2018 00:17 )  0.400 ng/mL / x     / x     / x     / x          Blood Culture:   08-04 @ 00:17  Pro Bnp 435        RADIOLOGY/EKG:

## 2018-08-13 DIAGNOSIS — R55 SYNCOPE AND COLLAPSE: ICD-10-CM

## 2018-08-13 DIAGNOSIS — I50.21 ACUTE SYSTOLIC (CONGESTIVE) HEART FAILURE: ICD-10-CM

## 2018-08-13 DIAGNOSIS — R00.1 BRADYCARDIA, UNSPECIFIED: ICD-10-CM

## 2018-08-13 DIAGNOSIS — I11.0 HYPERTENSIVE HEART DISEASE WITH HEART FAILURE: ICD-10-CM

## 2018-08-13 DIAGNOSIS — I42.8 OTHER CARDIOMYOPATHIES: ICD-10-CM

## 2018-08-13 DIAGNOSIS — I48.0 PAROXYSMAL ATRIAL FIBRILLATION: ICD-10-CM

## 2018-08-13 DIAGNOSIS — I47.2 VENTRICULAR TACHYCARDIA: ICD-10-CM

## 2018-08-13 DIAGNOSIS — I08.1 RHEUMATIC DISORDERS OF BOTH MITRAL AND TRICUSPID VALVES: ICD-10-CM

## 2018-08-13 DIAGNOSIS — F41.8 OTHER SPECIFIED ANXIETY DISORDERS: ICD-10-CM

## 2018-08-16 NOTE — DISCHARGE NOTE ADULT - CARE PROVIDER_API CALL
[address] [place] [address] [facility]    Physical Therapy Daily Treatment Note  Date:  2018    Patient Name:  Aric Lucio    :  1970  MRN: [id number]  Medical/Treatment Diagnosis Information:  ·  Diagnosis:  R RTC Tendonitis, Subacromial impingement   · Treatment Diagnosis: M75.81, O99.43  Insurance/Certification information:  PT Insurance Information: [facility]  Physician Information:  Referring Practitioner: Lillie Ward     Insurance/Certification information:     Physician Information:     Plan of care signed (Y/N):     Date of Patient follow up with Physician:    G-Code (if applicable):      Date G-Code Applied:  2018       Progress Note: [x]  Yes  []  No      Latex Allergy:  [x]NO      []YES     Preferred Language for Healthcare:   [x]English       []other:     Visit # Insurance Allowable   5 20     Pain level:  1-2/10     SUBJECTIVE:  See eval    OBJECTIVE: See eval  Observation:   Test measurements:  116 ° AROM, 126 ° PROM Knee Flex  Patient educated on following:    RESTRICTIONS/PRECAUTIONS:     Exercises/Interventions:   Therapeutic Ex Wt/Sets/Reps/Hold Notes   Pulley 5'  Flex with 5\" hold        Rows, Ext 30 x  Green TBand   IR, ER 30 x  Green TBand   Wall Slides Flex 15 x 10\"    Supine Punches 2 x15    ABCs  1x    SL Flex,Abd, ER  2 x 15 ER/Abd 1#        Prone Rows, Ext 15 x          Standing I, Y T 30 x  Is x 1# / 25 x  Ys Ts 1#         Supine RS  2 x 20\"                        Manual     Gr I-II mobs for tissue reactivity     PROM-all planes 10'    GR III-IV mobs for arthrokinematics     Cervical/long axis distraction     Thoracic PA mobs     PNF for strengthening     PNF for agonist/antagonist inhibition          Pt education 10' HEP education and postural education     Therapeutic Exercise and NMR EXR  [x] ([id number]) Provided verbal/tactile cueing for activities related to strengthening, flexibility, endurance, ROM  for improvements in scapular, scapulothoracic Independent in HEP and progression per patient tolerance, in order to prevent re-injury. 2. Patient will have a decrease in pain to facilitate improvement in movement, function, and ADLs as indicated by Functional Deficits.     Long Term Goals: To be achieved in: 10 weeks  1. Disability index score of 10% or less for the Quick Dash to assist with reaching prior level of function. 2. Patient will demonstrate increased AROM to equal the opposite side bilaterally to allow for proper joint functioning as indicated by patients Functional Deficits. 3. Patient will demonstrate an increase in strength of R UE = L UE to allow for proper functional mobility as indicated by patients Functional Deficits. 4. Patient will return to all transfers, work activities, and functional activities without increased symptoms or restriction. 5. Patient will have 0/10 pain with ADL's.  6. Patient stated goal: to feel better and get back to doing yoga                                     Goals that are underlined signify the goal has been accomplished. Progression Towards Functional goals:  [] Patient is progressing as expected towards functional goals listed. [] Progression is slowed due to complexities listed. [] Progression has been slowed due to co-morbidities.   [x] Plan just implemented, too soon to assess goals progression  [] Other:     ASSESSMENT:  See eval    Treatment/Activity Tolerance:   [x] Patient tolerated treatment well [] Patient limited by fatique  [] Patient limited by pain  [] Patient limited by other medical complications  [] Other:     Prognosis: [x] Good [] Fair  [] Poor    Patient Requires Follow-up: [x] Yes  [] No    PLAN: See eval  [x] Continue per plan of care [] Alter current plan (see comments)  [] Plan of care initiated [] Hold pending MD visit [] Discharge    Electronically signed by: Ancelmo Tomlinson PT Flor Chang), Internal Medicine  [address] [facility]  [address] [place] [address]  Phone: [phone]  Fax: [phone]    Braulio Sherwood), Cardiovascular Disease; Internal Medicine  [address] [place] [address]  Phone: [phone]  Fax: [phone]

## 2018-11-30 NOTE — DISCHARGE NOTE ADULT - INSTRUCTIONS
Reason For Visit  ANTON PURCELL is an established patient here today for a hospital follow-up [facility] follow up due to a seizure.   She is unaccompanied.        History of Present Illness  Here for hospital follow up on multiple issues.    Complicated history. Patient was admitted at [facility] on September 9 with altered mental status and seizure activity. An MRI was obtained which suggested an underlying cerebritis or encephalitis issue. CSF analysis however was unrevealing. Subsequently her Topamax was changed to Depakote and Keppra. She did not have any seizure activity for approximately 48 hours when suddenly, she developed syncope, hypotension and hypoxia on September 12. She was transferred to the ICU where it was found that she had a submassive pulmonary embolism. There is evidence of filling defect within the distal right and distal left main pulmonary arteries along with evidence of right heart strain. A transesophageal echo was subsequently ordered which showed normal left ventricular function along with mildly dilated right atrium. There was trivial aortic valve and mitral valve and tricuspid valve regurgitation. Her ejection fraction was estimated around 55%.    A Doppler study of the lower extremity however did not reveal a right femoral vein. Patient does not have any pain or swelling of the leg here today.    Patient also had an MRI of the brain as mentioned which showed evidence of a subacute small infarct in the medial left thalamus. Again there were findings to suggest underlying viral encephalitis type changes.    Ultimately, the Depakote was discontinued and she was switched to Topamax and Keppra. Her Topamax was actually increased to total of 300 mg twice daily. She does have outpatient follow-up with neurology in 3 months. She is actually continuing to follow with her neurologist at [place], Dr. Cesar.    Has not been taking her Eliquis...not been on anything for 5  days! Patient states that she was uncertain she could afford the medication. Fortunately, she is not experiencing any shortness of breath, chest pains, although she has been having just some generalized weakness. In review of her patient's medications today, it appears that she is actually been taking her Depakote along with her other seizure medications including her topiramate and Keppra.    She is not aware of any family history of clotting disorders.      Review of Systems    Const: no fever, no chills and feeling tired .   CV: no chest pain, no palpitations, no lightheadedness, no lower extremity edema, no dyspnea on exertion, no PND and no orthopnea.   Resp: not expressed as feeling short of breath.   GI: no diarrhea and no constipation.   : no change in urinary frequency, no feelings of urinary urgency, no urinary loss of control and no dysuria.   Musc: no joint pain.   Neuro: difficulty walking, dizziness and memory lapses or loss, but no fainting and no headache.   Psych: normal enjoyment of activities and not feeling sad, unhappy.       Current Meds   1. Acetaminophen 325 MG Oral Tablet; TAKE 1 TO 2 TABLETS EVERY 6 HOURS AS   NEEDED;   Therapy: 58Oms6052 to (Evaluate:37Ans9305); Last Rx:[id number] Ordered   2. Alendronate Sodium 70 MG Oral Tablet; 1 WEEKLY;   Therapy: 47Por1338 to (Last Rx:95Dri4194) Ordered   3. Aspirin 81 MG TABS; 1 every day;   Therapy: 97Phy1850 to (Last Rx:95Iix8777) Ordered   4. Folic Acid 1 MG Oral Tablet; take 3 tabs daily;   Therapy: 20Wke9068 to (Last Rx:66Smn9367) Ordered   5. LevETIRAcetam 750 MG Oral Tablet; TAKE 2 TABLETS TWICE DAILY;   Therapy: 92Fqy2690 to (Evaluate:92Vvq6534); Last Rx:51Ibw5834 Ordered   6. Levothyroxine Sodium 50 MCG Oral Tablet; TAKE 1 TABLET BY MOUTH EVERY DAY FOR   THYROID  (DUE FOR LABS AND OFFICE VISIT);   Therapy: 88Ayq7807 to (Evaluate:65Prr2361)  Requested for: 25Jun2018; Last   Rx:[id number] Ordered   7. Medical ID Bracelet; Seizure Disorder     Contact Jane Hoang   [phone];   Therapy: 01Gry2134 to (Last Rx:37Svi5498) Ordered   8. Multiple Vitamin TABS; TAKE 1 TABLET DAILY;   Therapy: 44Xxb5335 to (Evaluate:97Ocu8184); Last Rx:27Qur3968 Ordered   9. Topiramate 200 MG Oral Tablet; TAKE 1 TABLET BY MOUTH TWICE DAILY;   Therapy: 06Ojp0096 to (Evaluate:36Jgy0260)  Requested for: 08Jun2016; Last   Rx:[id number] Ordered   10. Vitamin D 1000 UNIT Oral Tablet; TAKE 1 TABLET DAILY;    Therapy: 05Uuf2652 to (Evaluate:93Pjh5793); Last Rx:36Dey7238 Ordered   11. 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  Nasal turbinate hypertrophy (J34.3)   · Patient is doing well s/p Septoplasty, Inferior turbinate reduction with microdebrider on      5/11/15 by Dr Medrano.  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)  History of urinary tract infection (Z87.440)  History of Joint pain, knee (M25.569)  History of Left otitis media (H66.92)  History of Osteoporosis (M81.0)  Personal history of malignant neoplasm of breast (Z85.3)  History of Plantar fasciitis (M72.2)  History of Strep tonsillitis (J03.00)    Surgical History  History of breast augmentation   · 2018 - Left breast  History of Breast Surgery Reconstruction   · 18 - right breast  History of  Section   · Last Impression: 2015  10/4/1988 & 1998  Latia Christian     History of Complete Colonoscopy   · 10/12/2016, diverticulosis, hemorrhoids, tortuous colon.  History of Excision Of Turbinate   · 5.11.15 Dr. Sha Medrano  History of Extended Simple Mastectomy Right Breast   ·  right breast  History of Pain Management By Electrical Stimulus  History of Septoplasty   · With inferior turbinate reduction with microdebrider, nasal endoscopy  History of Therapeutic Cystoscopy   ·  - Bladder diverticula fulgaration    Family History  Mother   Family history of Lung cancer  Sister   Family history of Thyroid disease  Brother   Family history of Esophageal cancer  Family history of Rheumatoid arthritis    Social History  Former smoker (Z87.891)   · quit     Vitals  Signs   Recorded: 21Xsr6623 01:50PM   Height: 5 ft 5 in  Weight: 142 lb   BMI Calculated: 23.63  BSA Calculated: 1.71  Systolic: 112, LUE, Sitting  Diastolic: 68, LUE, Sitting  Temperature: 97.8 F, Temporal  Heart Rate: 63  Respiration: 16  O2 Saturation: 96  Pain Scale: 00    Physical Exam  Constitutional: alert, in no acute distress, current vital signs reviewed and  appears healthy, within normal limits of ideal weight, well hydrated and appearance reflects stated age.   Head and Face: atraumatic, no deformities, normocephalic, normal facies.   Eyes: no discharge, normal conjunctiva, no eyelid swelling, no ptosis and the sclerae were  normal. pupils equal, round and reactive to light and accommodation, conjugate gaze and extraocular movements were intact.   ENT: normal appearing outer ear, normal appearing nose. examination of the tympanic membrane showed normal landmarks, normal appearing external canal. nasal mucosa moist and pink, no nasal discharge. normal lips. oral mucosa pink and moist, no oral lesions.   Neck: normal appearing neck, supple neck and no mass was seen. thyroid not enlarged and no thyroid nodules.   Lymphatic: Lymph Nodes: no anterior cervical node enlargement, no posterior cervical node enlargement, no submandibular node enlargement and no supraclavicular node enlargement.   Pulmonary: no respiratory distress, normal respiratory rate and effort and no accessory muscle use. breath sounds clear to auscultation bilaterally.   Cardiovascular: normal rate, no murmurs were heard, regular rhythm, normal S1 and normal S2. no right carotid bruit no left carotid bruit edema was not present in the lower extremities.   Abdomen: soft, nontender, nondistended, normal bowel sounds and no abdominal mass. no hepatomegaly and no splenomegaly. no umbilical hernia was discovered.   Musculoskeletal:  (Mildly ataxic today). Gait evaluation demonstrated no antalgia.   Neurologic: cranial nerves grossly intact. no sensory deficits noted.   Psychiatric: oriented to person, oriented to place and oriented to time. alert and awake, interactive and mood/affect were appropriate. judgement not impaired. normal attention span. short term memory intact.   Skin, Hair, Nails: normal skin color and pigmentation. normal skin turgor. no clubbing or cyanosis of the fingernails.      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 She had a DVT of the right lower extremity noted. I am concerned that we will not be able to get prior authorization completed in time. We should be able to get Lovenox covered at 60 mg subcutaneously every 12 hours as well as start patient on Coumadin 4 mg daily. Draw pro time today. I will plan on seeing her again either Friday or Saturday morning to reassess patient and have another repeat pro time done at that time.    With regards to the pulmonary embolism, it would be recommended that she be on some type of anticoagulation for at least 3-6 months. The only provoking factor for DVT in this patient was the fact that she had been hospitalized for several days. We will discuss it further at her follow-up in a few days, but I would like to consider consultation with hematologist for patient.    She also should have a carotid artery study done given the thalamic infarct noted on MRI. The echocardiogram looked reassuring.    Obviously neurology will be following up with her regarding her seizure issues.    We will go through all medications today and make sure everything is as scheduled and directed.   Medical compliance with plan discussed and risks of non-compliance reviewed.    Patient education completed on disease process, etiology & prognosis.    Patient expresses understanding of the plan.    Proper usage and side effects of medications reviewed & discussed.      Signatures   Electronically signed by : Azeb Barrera, ; Sep 25 2018  1:54PM CST    Electronically signed by : PETER GALINDO MD; Sep 25 2018  8:16PM CST     Low Sodium, low fat diet  Avoid strenuous activity,

## 2022-07-25 ENCOUNTER — EMERGENCY (EMERGENCY)
Facility: HOSPITAL | Age: 52
LOS: 0 days | Discharge: HOME | End: 2022-07-26
Attending: EMERGENCY MEDICINE
Payer: COMMERCIAL

## 2022-07-25 VITALS — WEIGHT: 289.91 LBS | HEIGHT: 64 IN

## 2022-07-25 DIAGNOSIS — R00.2 PALPITATIONS: ICD-10-CM

## 2022-07-25 DIAGNOSIS — R20.2 PARESTHESIA OF SKIN: ICD-10-CM

## 2022-07-25 DIAGNOSIS — R51.9 HEADACHE, UNSPECIFIED: ICD-10-CM

## 2022-07-25 DIAGNOSIS — I49.9 CARDIAC ARRHYTHMIA, UNSPECIFIED: ICD-10-CM

## 2022-07-25 DIAGNOSIS — N39.0 URINARY TRACT INFECTION, SITE NOT SPECIFIED: ICD-10-CM

## 2022-07-25 DIAGNOSIS — Z79.82 LONG TERM (CURRENT) USE OF ASPIRIN: ICD-10-CM

## 2022-07-25 DIAGNOSIS — F41.0 PANIC DISORDER [EPISODIC PAROXYSMAL ANXIETY]: ICD-10-CM

## 2022-07-25 DIAGNOSIS — R42 DIZZINESS AND GIDDINESS: ICD-10-CM

## 2022-07-25 DIAGNOSIS — I48.91 UNSPECIFIED ATRIAL FIBRILLATION: ICD-10-CM

## 2022-07-25 LAB
BASOPHILS # BLD AUTO: 0.07 K/UL — SIGNIFICANT CHANGE UP (ref 0–0.2)
BASOPHILS NFR BLD AUTO: 0.4 % — SIGNIFICANT CHANGE UP (ref 0–2)
D DIMER BLD IA.RAPID-MCNC: 203 NG/ML DDU — SIGNIFICANT CHANGE UP
EOSINOPHIL # BLD AUTO: 0.48 K/UL — SIGNIFICANT CHANGE UP (ref 0–0.5)
EOSINOPHIL NFR BLD AUTO: 3 % — SIGNIFICANT CHANGE UP (ref 0–6)
HCT VFR BLD CALC: 43.3 % — SIGNIFICANT CHANGE UP (ref 34.5–45)
HGB BLD-MCNC: 14 G/DL — SIGNIFICANT CHANGE UP (ref 11.5–15.5)
IMM GRANULOCYTES NFR BLD AUTO: 0.3 % — SIGNIFICANT CHANGE UP (ref 0–1.5)
LYMPHOCYTES # BLD AUTO: 15.3 % — SIGNIFICANT CHANGE UP (ref 13–44)
LYMPHOCYTES # BLD AUTO: 2.44 K/UL — SIGNIFICANT CHANGE UP (ref 1–3.3)
MCHC RBC-ENTMCNC: 27.1 PG — SIGNIFICANT CHANGE UP (ref 27–34)
MCHC RBC-ENTMCNC: 32.3 GM/DL — SIGNIFICANT CHANGE UP (ref 32–36)
MCV RBC AUTO: 83.9 FL — SIGNIFICANT CHANGE UP (ref 80–100)
MONOCYTES # BLD AUTO: 0.7 K/UL — SIGNIFICANT CHANGE UP (ref 0–0.9)
MONOCYTES NFR BLD AUTO: 4.4 % — SIGNIFICANT CHANGE UP (ref 2–14)
NEUTROPHILS # BLD AUTO: 12.19 K/UL — HIGH (ref 1.8–7.4)
NEUTROPHILS NFR BLD AUTO: 76.6 % — SIGNIFICANT CHANGE UP (ref 43–77)
PLATELET # BLD AUTO: 366 K/UL — SIGNIFICANT CHANGE UP (ref 150–400)
RBC # BLD: 5.16 M/UL — SIGNIFICANT CHANGE UP (ref 3.8–5.2)
RBC # FLD: 13.2 % — SIGNIFICANT CHANGE UP (ref 10.3–14.5)
WBC # BLD: 15.93 K/UL — HIGH (ref 3.8–10.5)
WBC # FLD AUTO: 15.93 K/UL — HIGH (ref 3.8–10.5)

## 2022-07-25 PROCEDURE — 81001 URINALYSIS AUTO W/SCOPE: CPT

## 2022-07-25 PROCEDURE — 85379 FIBRIN DEGRADATION QUANT: CPT

## 2022-07-25 PROCEDURE — 70450 CT HEAD/BRAIN W/O DYE: CPT | Mod: MA

## 2022-07-25 PROCEDURE — 99285 EMERGENCY DEPT VISIT HI MDM: CPT

## 2022-07-25 PROCEDURE — 93010 ELECTROCARDIOGRAM REPORT: CPT

## 2022-07-25 PROCEDURE — 36415 COLL VENOUS BLD VENIPUNCTURE: CPT

## 2022-07-25 PROCEDURE — 84484 ASSAY OF TROPONIN QUANT: CPT

## 2022-07-25 PROCEDURE — 85025 COMPLETE CBC W/AUTO DIFF WBC: CPT

## 2022-07-25 PROCEDURE — 93005 ELECTROCARDIOGRAM TRACING: CPT

## 2022-07-25 PROCEDURE — 70450 CT HEAD/BRAIN W/O DYE: CPT | Mod: 26,MA

## 2022-07-25 PROCEDURE — 99285 EMERGENCY DEPT VISIT HI MDM: CPT | Mod: 25

## 2022-07-25 PROCEDURE — 80053 COMPREHEN METABOLIC PANEL: CPT

## 2022-07-25 NOTE — ED STATDOCS - NSFOLLOWUPINSTRUCTIONS_ED_ALL_ED_FT
Urinary Tract Infection, Adult       A urinary tract infection (UTI) is an infection of any part of the urinary tract. The urinary tract includes the kidneys, ureters, bladder, and urethra. These organs make, store, and get rid of urine in the body.    An upper UTI affects the ureters and kidneys. A lower UTI affects the bladder and urethra.      What are the causes?    Most urinary tract infections are caused by bacteria in your genital area around your urethra, where urine leaves your body. These bacteria grow and cause inflammation of your urinary tract.      What increases the risk?    You are more likely to develop this condition if:  •You have a urinary catheter that stays in place.      •You are not able to control when you urinate or have a bowel movement (incontinence).    •You are female and you:  •Use a spermicide or diaphragm for birth control.      •Have low estrogen levels.      •Are pregnant.        •You have certain genes that increase your risk.      •You are sexually active.      •You take antibiotic medicines.    •You have a condition that causes your flow of urine to slow down, such as:  •An enlarged prostate, if you are male.      •Blockage in your urethra.      •A kidney stone.      •A nerve condition that affects your bladder control (neurogenic bladder).      •Not getting enough to drink, or not urinating often.      •You have certain medical conditions, such as:  •Diabetes.      •A weak disease-fighting system (immunesystem).      •Sickle cell disease.      •Gout.      •Spinal cord injury.          What are the signs or symptoms?    Symptoms of this condition include:  •Needing to urinate right away (urgency).      •Frequent urination. This may include small amounts of urine each time you urinate.      •Pain or burning with urination.      •Blood in the urine.      •Urine that smells bad or unusual.      •Trouble urinating.      •Cloudy urine.      •Vaginal discharge, if you are female.      •Pain in the abdomen or the lower back.      You may also have:  •Vomiting or a decreased appetite.      •Confusion.      •Irritability or tiredness.      •A fever or chills.      •Diarrhea.      The first symptom in older adults may be confusion. In some cases, they may not have any symptoms until the infection has worsened.      How is this diagnosed?    This condition is diagnosed based on your medical history and a physical exam. You may also have other tests, including:  •Urine tests.      •Blood tests.      •Tests for STIs (sexually transmitted infections).      If you have had more than one UTI, a cystoscopy or imaging studies may be done to determine the cause of the infections.      How is this treated?    Treatment for this condition includes:  •Antibiotic medicine.      •Over-the-counter medicines to treat discomfort.      •Drinking enough water to stay hydrated.      If you have frequent infections or have other conditions such as a kidney stone, you may need to see a health care provider who specializes in the urinary tract (urologist).    In rare cases, urinary tract infections can cause sepsis. Sepsis is a life-threatening condition that occurs when the body responds to an infection. Sepsis is treated in the hospital with IV antibiotics, fluids, and other medicines.      Follow these instructions at home:     Medicines     •Take over-the-counter and prescription medicines only as told by your health care provider.      •If you were prescribed an antibiotic medicine, take it as told by your health care provider. Do not stop using the antibiotic even if you start to feel better.      General instructions   •Make sure you:  •Empty your bladder often and completely. Do not hold urine for long periods of time.      •Empty your bladder after sex.      •Wipe from front to back after urinating or having a bowel movement if you are female. Use each tissue only one time when you wipe.        •Drink enough fluid to keep your urine pale yellow.      •Keep all follow-up visits. This is important.        Contact a health care provider if:    •Your symptoms do not get better after 1–2 days.      •Your symptoms go away and then return.        Get help right away if:    •You have severe pain in your back or your lower abdomen.      •You have a fever or chills.      •You have nausea or vomiting.        Summary    •A urinary tract infection (UTI) is an infection of any part of the urinary tract, which includes the kidneys, ureters, bladder, and urethra.      •Most urinary tract infections are caused by bacteria in your genital area.      •Treatment for this condition often includes antibiotic medicines.      •If you were prescribed an antibiotic medicine, take it as told by your health care provider. Do not stop using the antibiotic even if you start to feel better.      •Keep all follow-up visits. This is important.      This information is not intended to replace advice given to you by your health care provider. Make sure you discuss any questions you have with your health care provider.      Document Revised: 07/30/2021 Document Reviewed: 07/30/2021    Elsevier Patient Education © 2022 [employer]

## 2022-07-25 NOTE — ED STATDOCS - OBJECTIVE STATEMENT
53 yo female w/PMHx of hormonal induced panic attacks, Afib, fatal heart syndrome presents to the ED c/o tingling to the whole body, HA, smelling of something burning. Pt was sitting on the cough laughing when symptoms started. Pt did not take anything for the HA. Pt states when she moves her head she becomes dizzy. No other complaints at this time. Pt came to the ED to make sure that she did not have a stroke or Afib. Pt had a kaleidoscope HA x1 month ago. Pt states that HA was different from usual HA. Denies nausea, vomiting.

## 2022-07-25 NOTE — ED ADULT TRIAGE NOTE - CHIEF COMPLAINT QUOTE
Pt c/o dizziness and palpitation. States she felt like she was going to pass out 40 min PTA. Endorses chest pain with/ot shortness of breath. Denies radiation if pain. Hx of panic attacks. Denies fevers. EKG in progress.

## 2022-07-25 NOTE — ED STATDOCS - PATIENT PORTAL LINK FT
You can access the FollowMyHealth Patient Portal offered by [facility] by registering at the following website: [email or web address]. By joining [facility]’s FollowMyHealth portal, you will also be able to view your health information using other applications (apps) compatible with our system.

## 2022-07-26 VITALS
DIASTOLIC BLOOD PRESSURE: 89 MMHG | SYSTOLIC BLOOD PRESSURE: 148 MMHG | TEMPERATURE: 98 F | OXYGEN SATURATION: 100 % | HEART RATE: 82 BPM | RESPIRATION RATE: 18 BRPM

## 2022-07-26 LAB
APPEARANCE UR: CLEAR — SIGNIFICANT CHANGE UP
BILIRUB UR-MCNC: NEGATIVE — SIGNIFICANT CHANGE UP
COLOR SPEC: YELLOW — SIGNIFICANT CHANGE UP
DIFF PNL FLD: NEGATIVE — SIGNIFICANT CHANGE UP
GLUCOSE UR QL: NEGATIVE — SIGNIFICANT CHANGE UP
KETONES UR-MCNC: NEGATIVE — SIGNIFICANT CHANGE UP
LEUKOCYTE ESTERASE UR-ACNC: ABNORMAL
NITRITE UR-MCNC: NEGATIVE — SIGNIFICANT CHANGE UP
PH UR: 8 — SIGNIFICANT CHANGE UP (ref 5–8)
PROT UR-MCNC: NEGATIVE — SIGNIFICANT CHANGE UP
SP GR SPEC: 1.01 — SIGNIFICANT CHANGE UP (ref 1.01–1.02)
UROBILINOGEN FLD QL: NEGATIVE — SIGNIFICANT CHANGE UP

## 2022-07-26 RX ORDER — CEPHALEXIN 500 MG
500 CAPSULE ORAL ONCE
Refills: 0 | Status: COMPLETED | OUTPATIENT
Start: 2022-07-26 | End: 2022-07-26

## 2022-07-26 RX ORDER — CEPHALEXIN 500 MG
1 CAPSULE ORAL
Qty: 15 | Refills: 0
Start: 2022-07-26 | End: 2022-07-30

## 2022-07-26 RX ADMIN — Medication 500 MILLIGRAM(S): at 02:55

## 2025-02-13 NOTE — DISCHARGE NOTE ADULT - CARE PROVIDERS DIRECT ADDRESSES
[email or web address] (4) no limitation

## 2025-05-08 NOTE — DISCHARGE NOTE ADULT - NS DC ANGIO PCI YN
Subjective   Patient ID: Olive Courtney is a 89 y.o. female who presents for Follow-up.  History of Present Illness  Olive Courtney is a 89 year old female with a pacemaker who presents with labored breathing and chest discomfort. She is accompanied by her daughter.    She experiences labored breathing, particularly during episodes of anxiety, which is more pronounced than usual. Her breathing issues are more related to anxiety and arm movement rather than physical activity. She experiences discomfort when raising her arm, which may originate from her chest, occurring when she raises her arm above a certain angle.    Her pacemaker usage has increased from 1-2% to 7% of the time, with a battery replacement scheduled in a month. Monitoring occurs every two months due to increased usage.    Her daughter notes that her legs appear redder than usual, with mild dermatitis and age spots but no significant swelling or weeping. She frequently experiences fatigue and is often found sleeping in her chair. She takes trazodone, melatonin, and quetiapine at night to aid with sleep and manage agitation. Her daughter is concerned about potential overmedication.      PMHx, FHx, Social Hx, Surg Hx personally reviewed at this appointment. No pertinent findings and/or changes from prior (if applicable).    ROS: Unless specified above, pt denies wt gain/loss f/c HA LoC CP SOB NVDC. See HPI above, and scanned sheet (if applicable). All other systems are reviewed and are without complaint.     Objective     /79   Pulse 89   Wt 54.9 kg (121 lb)   SpO2 95%   BMI 23.63 kg/m²      Physical Exam  Gen: elderly appearance. AAO x 3  HEENT: NC/AT. Anicteric sclera, symmetric pupils.   Neck: Soft, supple. No LAD. No goiter.   CV: RRR nl s1s2 no m/r/g  Pulm: CTAB no w/r/r, good air exchange  GI: soft NTND BS+ no hsm  Ext: WWP no edema  Neuro: II-XII grossly intact, nonfocal systemic findings  MSK: 5/5 strength b/l UE and LE  Gait:  unremarkable        Lab Results   Component Value Date    WBC 5.0 07/09/2024    HGB 12.4 07/09/2024    HCT 38.1 07/09/2024     07/09/2024    CHOL 186 09/11/2023    TRIG 64 09/11/2023    HDL 52.3 09/11/2023    ALT 11 07/09/2024    AST 22 07/09/2024     07/09/2024    K 4.0 07/09/2024     07/09/2024    CREATININE 0.71 07/09/2024    BUN 16 07/09/2024    CO2 26 07/09/2024    TSH 0.65 06/30/2021    INR 1.0 10/06/2021    HGBA1C 5.2 10/07/2021     par     Current Outpatient Medications   Medication Instructions    amLODIPine (NORVASC) 5 mg, oral, Daily    aspirin 81 mg chewable tablet 1 tablet, oral, Daily    atorvastatin (Lipitor) 40 mg tablet 1 tablet, oral, Daily    escitalopram (LEXAPRO) 20 mg, oral, Daily    melatonin 5 mg tablet, sublingual 1 tablet, oral, Every 24 hours    QUEtiapine (SEROQUEL) 25 mg, oral, Nightly    traZODone (DESYREL) 100 mg, oral, Nightly        XR hip left with pelvis when performed 2 or 3 views  Narrative: Interpreted By:  Aldo Becker,   STUDY:  XR HIP LEFT WITH PELVIS WHEN PERFORMED 2 OR 3 VIEWS; ;  7/22/2024  4:35 pm      INDICATION:  Signs/Symptoms:fall on L hip. pain. age 89..      COMPARISON:  03/09/2011      ACCESSION NUMBER(S):  [id number]      ORDERING CLINICIAN:  JAJA LÓPEZ      FINDINGS:  Left hip, three views      Left hip hemiarthroplasty in place. No periprosthetic fracture  lucency seen. There is no dislocation. Severe diffuse osteopenia      Impression: Interval left hip tibia arthroplasty. No hardware failure seen.  Severe osteopenia          MACRO:  None      Signed by: Aldo Becker 7/23/2024 9:04 PM  Dictation workstation:   [id number]  XR ribs 2 views left w chest pa or ap  Narrative: Interpreted By:  Aldo Becker,   STUDY:  XR RIBS 2 VIEWS LEFT WITH CHEST PA OR AP; ;  7/22/2024 4:32 pm      INDICATION:  Signs/Symptoms:fell on L side. Check this along with hip.      COMPARISON:  07/09/2024      ACCESSION NUMBER(S):  [id number]       ORDERING CLINICIAN:  TYRON LÓPEZ      FINDINGS:  Rib series, multiple views      Left-sided pacemaker in place.      Bilateral small effusions worse on the left. Bibasilar airspace  disease.      Subacute to remote left posterolateral rib fracture deformities  involving the 10th and 11th left ribs.      Impression: Remote appearing left rib fracture deformities posterolaterally.  Small left pleural effusion present.          MACRO:  Critical Finding:  See findings. Notification was initiated on  7/23/2024 at 6:57 pm by  Aldo Becker.  (**-YCF-**)      Signed by: Aldo Becker 7/23/2024 6:57 PM  Dictation workstation:   [id number]           Assessment & Plan  Pacemaker battery replacement  Increased pacemaker usage from 1-2% to 7%, necessitating battery replacement. Risks of not replacing include falls and serious complications.  - Proceed with pacemaker battery replacement as scheduled with Dr. Genesis Phillips.    Shortness of breath  Increased shortness of breath, particularly with anxiety. Differential includes musculoskeletal causes due to associated chest pain with arm movement.  - Order chest x-ray to rule out underlying pulmonary issues.    Musculoskeletal chest pain  Chest pain associated with arm movement, suggesting a musculoskeletal origin rather than cardiac.    Fatigue  Persistent fatigue potentially due to medication side effects or lack of physical activity. Current medications include trazodone, quetiapine, and melatonin.  - Order blood work to check for electrolyte imbalances or anemia.    Mild dermatitis  Mild dermatitis on legs, likely from superficial bumps. No significant concern.    Goals of Care  Emphasized maintaining quality of life and avoiding preventable complications. Desire for a peaceful end-of-life experience surrounded by loved ones.          Tyron López MD       This medical note was created with the assistance of artificial intelligence (AI) for documentation purposes.  The content has been reviewed and confirmed by the healthcare provider for accuracy and completeness. Patient consented to the use of audio recording and use of AI during their visit.           no